# Patient Record
Sex: MALE | Race: BLACK OR AFRICAN AMERICAN
[De-identification: names, ages, dates, MRNs, and addresses within clinical notes are randomized per-mention and may not be internally consistent; named-entity substitution may affect disease eponyms.]

---

## 2017-01-19 ENCOUNTER — APPOINTMENT (OUTPATIENT)
Dept: UROLOGY | Facility: CLINIC | Age: 77
End: 2017-01-19

## 2018-11-19 ENCOUNTER — APPOINTMENT (OUTPATIENT)
Dept: NEUROLOGY | Facility: CLINIC | Age: 78
End: 2018-11-19

## 2018-11-21 ENCOUNTER — APPOINTMENT (OUTPATIENT)
Dept: NEUROLOGY | Facility: CLINIC | Age: 78
End: 2018-11-21
Payer: MEDICARE

## 2018-11-21 VITALS
DIASTOLIC BLOOD PRESSURE: 85 MMHG | WEIGHT: 165 LBS | HEIGHT: 69 IN | HEART RATE: 75 BPM | SYSTOLIC BLOOD PRESSURE: 140 MMHG | BODY MASS INDEX: 24.44 KG/M2

## 2018-11-21 PROCEDURE — 99205 OFFICE O/P NEW HI 60 MIN: CPT

## 2018-11-23 ENCOUNTER — OTHER (OUTPATIENT)
Age: 78
End: 2018-11-23

## 2019-05-21 ENCOUNTER — APPOINTMENT (OUTPATIENT)
Dept: NEUROLOGY | Facility: CLINIC | Age: 79
End: 2019-05-21

## 2020-04-08 ENCOUNTER — APPOINTMENT (OUTPATIENT)
Dept: DISASTER EMERGENCY | Facility: CLINIC | Age: 80
End: 2020-04-08
Payer: MEDICARE

## 2020-04-08 VITALS — BODY MASS INDEX: 24.14 KG/M2 | HEIGHT: 69 IN | WEIGHT: 163 LBS

## 2020-04-08 VITALS
SYSTOLIC BLOOD PRESSURE: 132 MMHG | HEART RATE: 84 BPM | RESPIRATION RATE: 12 BRPM | DIASTOLIC BLOOD PRESSURE: 80 MMHG | OXYGEN SATURATION: 94 % | TEMPERATURE: 98 F

## 2020-04-08 LAB — SARS-COV-2 N GENE NPH QL NAA+PROBE: DETECTED

## 2020-04-08 PROCEDURE — 99202 OFFICE O/P NEW SF 15 MIN: CPT

## 2020-04-08 NOTE — HISTORY OF PRESENT ILLNESS
[Congestion] : congestion [Cough] : cough [___ Weeks ago] :  [unfilled] weeks ago [Constant] : constant [Patient presents to the office today for COVID-19 evaluation and testing.] : Patient presents to the office today for COVID-19 evaluation and testing. [] : moderate dizziness on standing [With Suspected Case] : patient exposed to a suspected case of COVID-19 [Age >= 60 years] : age >= 60 years [None] : none [Clear] : clear [Good Air Entry] : good air entry [Speaks in full sentences] : speaks in full sentences [Normal O2 sat at rest] : normal O2 sat at rest [Grossly normal, interacts, not tired or weak] : grossly normal, interacts, not tired or weak [COVID-19 testing ordered and specimen obtained] : COVID-19 testing ordered and specimen obtained [Discharged with current Quarantine instructions and advised of signs of worsening illness.] : Patient discharged with current quarantine instructions and advised of signs of worsening illness. Patient told to seek emergent care if symptoms occur. [FreeTextEntry1] : Jt is 79 year old male c/o coughing and intermittent dizziness x 1 week. Denies fever, body aches or chills. He reports mild diarrhea, denies vomiting. PMH includes HTN. He lives with his wife.  [TextBox_107] : Patient education provided for COVID-19. Explained increased symptoms and SOB, patient instructed to go to ED.  Discussed isolation precautions and handwashing techniques. Social distancing reviewed. Utilize Tylenol for fever >101. No signs of cardiovascular decompensation. Patient verbalized understanding of provided instructions. Tests results may take up to 3-7 days to result. Discussed possibility of false negative results. Instructed to self quarantine and must remain quarantined x 14 days and no fever for three days without antipyretic medication.  All patient close contacts should also self quarantine. Social distancing once quarantine is completed. If patient has symptoms of chest discomfort, severe SOB at rest, worsening shortness of breath with minimal exertion, new or worsening wheezing, dizziness were instructed to seek immediate medical evaluation. \par \par \par \par  [FreeTextEntry8] : Jt is 79 year old male c/o coughing and intermittent dizziness x 1 week. Denies fever, body aches or chills. He reports mild diarrhea, denies vomiting. PMH includes HTN. He lives with his wife.

## 2020-05-08 ENCOUNTER — TRANSCRIPTION ENCOUNTER (OUTPATIENT)
Age: 80
End: 2020-05-08

## 2020-06-20 ENCOUNTER — TRANSCRIPTION ENCOUNTER (OUTPATIENT)
Age: 80
End: 2020-06-20

## 2021-04-13 ENCOUNTER — NON-APPOINTMENT (OUTPATIENT)
Age: 81
End: 2021-04-13

## 2021-04-13 ENCOUNTER — APPOINTMENT (OUTPATIENT)
Dept: INTERNAL MEDICINE | Facility: CLINIC | Age: 81
End: 2021-04-13
Payer: MEDICARE

## 2021-04-13 VITALS
DIASTOLIC BLOOD PRESSURE: 79 MMHG | TEMPERATURE: 97.7 F | HEIGHT: 69 IN | HEART RATE: 80 BPM | SYSTOLIC BLOOD PRESSURE: 131 MMHG | OXYGEN SATURATION: 99 % | BODY MASS INDEX: 24.44 KG/M2 | WEIGHT: 165 LBS | RESPIRATION RATE: 16 BRPM

## 2021-04-13 DIAGNOSIS — Z20.822 CONTACT WITH AND (SUSPECTED) EXPOSURE TO COVID-19: ICD-10-CM

## 2021-04-13 DIAGNOSIS — F32.9 ANXIETY DISORDER, UNSPECIFIED: ICD-10-CM

## 2021-04-13 DIAGNOSIS — R33.9 RETENTION OF URINE, UNSPECIFIED: ICD-10-CM

## 2021-04-13 DIAGNOSIS — Z87.39 PERSONAL HISTORY OF OTHER DISEASES OF THE MUSCULOSKELETAL SYSTEM AND CONNECTIVE TISSUE: ICD-10-CM

## 2021-04-13 DIAGNOSIS — F41.9 ANXIETY DISORDER, UNSPECIFIED: ICD-10-CM

## 2021-04-13 DIAGNOSIS — Z87.448 PERSONAL HISTORY OF OTHER DISEASES OF URINARY SYSTEM: ICD-10-CM

## 2021-04-13 DIAGNOSIS — Z87.898 PERSONAL HISTORY OF OTHER SPECIFIED CONDITIONS: ICD-10-CM

## 2021-04-13 DIAGNOSIS — N40.1 BENIGN PROSTATIC HYPERPLASIA WITH LOWER URINARY TRACT SYMPMS: ICD-10-CM

## 2021-04-13 PROCEDURE — 99072 ADDL SUPL MATRL&STAF TM PHE: CPT

## 2021-04-13 PROCEDURE — 93000 ELECTROCARDIOGRAM COMPLETE: CPT | Mod: 59

## 2021-04-13 PROCEDURE — G0439: CPT

## 2021-04-13 PROCEDURE — G0444 DEPRESSION SCREEN ANNUAL: CPT | Mod: 59

## 2021-04-13 RX ORDER — BLOOD-GLUCOSE METER
KIT MISCELLANEOUS
Qty: 2 | Refills: 1 | Status: ACTIVE | COMMUNITY
Start: 2021-04-13 | End: 1900-01-01

## 2021-04-13 RX ORDER — ESCITALOPRAM OXALATE 20 MG/1
20 TABLET ORAL
Qty: 30 | Refills: 0 | Status: DISCONTINUED | COMMUNITY
Start: 2020-11-04

## 2021-04-13 RX ORDER — CHLORHEXIDINE GLUCONATE 4 %
1000 LIQUID (ML) TOPICAL
Refills: 0 | Status: ACTIVE | COMMUNITY
Start: 2021-03-24

## 2021-04-13 RX ORDER — AMITRIPTYLINE HYDROCHLORIDE 75 MG/1
75 TABLET, FILM COATED ORAL
Qty: 30 | Refills: 0 | Status: DISCONTINUED | COMMUNITY
Start: 2020-11-04

## 2021-04-13 RX ORDER — LANCETS 30 GAUGE
EACH MISCELLANEOUS
Qty: 180 | Refills: 1 | Status: ACTIVE | COMMUNITY
Start: 2021-04-13 | End: 1900-01-01

## 2021-04-13 RX ORDER — BLOOD-GLUCOSE METER
W/DEVICE KIT MISCELLANEOUS
Qty: 1 | Refills: 0 | Status: ACTIVE | COMMUNITY
Start: 2021-04-13 | End: 1900-01-01

## 2021-04-13 RX ORDER — NYSTATIN 100000 [USP'U]/G
100000 CREAM TOPICAL
Qty: 60 | Refills: 0 | Status: DISCONTINUED | COMMUNITY
Start: 2020-12-04

## 2021-04-13 RX ORDER — FINASTERIDE 1 MG/1
TABLET ORAL
Refills: 0 | Status: ACTIVE | COMMUNITY
Start: 2021-04-13

## 2021-04-13 RX ORDER — AMITRIPTYLINE HYDROCHLORIDE 25 MG/1
25 TABLET, FILM COATED ORAL
Refills: 0 | Status: ACTIVE | COMMUNITY
Start: 2021-02-23

## 2021-04-18 LAB
25(OH)D3 SERPL-MCNC: 28.4 NG/ML
ALBUMIN SERPL ELPH-MCNC: 4.5 G/DL
ALP BLD-CCNC: 55 U/L
ALT SERPL-CCNC: 16 U/L
ANION GAP SERPL CALC-SCNC: 13 MMOL/L
AST SERPL-CCNC: 21 U/L
BASOPHILS # BLD AUTO: 0.04 K/UL
BASOPHILS NFR BLD AUTO: 1 %
BILIRUB SERPL-MCNC: 0.4 MG/DL
BUN SERPL-MCNC: 16 MG/DL
CALCIUM SERPL-MCNC: 11 MG/DL
CHLORIDE SERPL-SCNC: 96 MMOL/L
CHOLEST SERPL-MCNC: 129 MG/DL
CO2 SERPL-SCNC: 28 MMOL/L
CREAT SERPL-MCNC: 1.24 MG/DL
CREAT SPEC-SCNC: 80 MG/DL
EOSINOPHIL # BLD AUTO: 0.16 K/UL
EOSINOPHIL NFR BLD AUTO: 3.9 %
ESTIMATED AVERAGE GLUCOSE: 169 MG/DL
GLUCOSE SERPL-MCNC: 170 MG/DL
HBA1C MFR BLD HPLC: 7.5 %
HCT VFR BLD CALC: 36.3 %
HDLC SERPL-MCNC: 63 MG/DL
HGB BLD-MCNC: 12.5 G/DL
IMM GRANULOCYTES NFR BLD AUTO: 0.2 %
LDLC SERPL CALC-MCNC: 49 MG/DL
LYMPHOCYTES # BLD AUTO: 1.44 K/UL
LYMPHOCYTES NFR BLD AUTO: 35.3 %
MAN DIFF?: NORMAL
MCHC RBC-ENTMCNC: 32.4 PG
MCHC RBC-ENTMCNC: 34.4 GM/DL
MCV RBC AUTO: 94 FL
MICROALBUMIN 24H UR DL<=1MG/L-MCNC: <1.2 MG/DL
MICROALBUMIN/CREAT 24H UR-RTO: NORMAL MG/G
MONOCYTES # BLD AUTO: 0.42 K/UL
MONOCYTES NFR BLD AUTO: 10.3 %
NEUTROPHILS # BLD AUTO: 2.01 K/UL
NEUTROPHILS NFR BLD AUTO: 49.3 %
NONHDLC SERPL-MCNC: 66 MG/DL
PLATELET # BLD AUTO: 168 K/UL
POTASSIUM SERPL-SCNC: 4.5 MMOL/L
PROT SERPL-MCNC: 7.7 G/DL
PSA SERPL-MCNC: 3.47 NG/ML
RBC # BLD: 3.86 M/UL
RBC # FLD: 14.1 %
SODIUM SERPL-SCNC: 137 MMOL/L
TRIGL SERPL-MCNC: 85 MG/DL
TSH SERPL-ACNC: 3.46 UIU/ML
WBC # FLD AUTO: 4.08 K/UL

## 2021-04-20 LAB
ALBUMIN SERPL ELPH-MCNC: 4.3 G/DL
ALP BLD-CCNC: 54 U/L
ALT SERPL-CCNC: 13 U/L
ANION GAP SERPL CALC-SCNC: 15 MMOL/L
AST SERPL-CCNC: 23 U/L
BASOPHILS # BLD AUTO: 0.04 K/UL
BASOPHILS NFR BLD AUTO: 0.7 %
BILIRUB SERPL-MCNC: 0.2 MG/DL
BUN SERPL-MCNC: 23 MG/DL
CALCIUM SERPL-MCNC: 10.4 MG/DL
CALCIUM SERPL-MCNC: 10.4 MG/DL
CHLORIDE SERPL-SCNC: 94 MMOL/L
CO2 SERPL-SCNC: 26 MMOL/L
CREAT SERPL-MCNC: 1.78 MG/DL
EOSINOPHIL # BLD AUTO: 0.17 K/UL
EOSINOPHIL NFR BLD AUTO: 2.9 %
FERRITIN SERPL-MCNC: 82 NG/ML
FOLATE SERPL-MCNC: >20 NG/ML
GLUCOSE SERPL-MCNC: 191 MG/DL
HCT VFR BLD CALC: 33.9 %
HGB BLD-MCNC: 11.7 G/DL
IMM GRANULOCYTES NFR BLD AUTO: 0.3 %
IRON SATN MFR SERPL: 27 %
IRON SERPL-MCNC: 81 UG/DL
LYMPHOCYTES # BLD AUTO: 2.39 K/UL
LYMPHOCYTES NFR BLD AUTO: 40.8 %
MAN DIFF?: NORMAL
MCHC RBC-ENTMCNC: 32.3 PG
MCHC RBC-ENTMCNC: 34.5 GM/DL
MCV RBC AUTO: 93.6 FL
MONOCYTES # BLD AUTO: 0.55 K/UL
MONOCYTES NFR BLD AUTO: 9.4 %
NEUTROPHILS # BLD AUTO: 2.69 K/UL
NEUTROPHILS NFR BLD AUTO: 45.9 %
PARATHYROID HORMONE INTACT: 66 PG/ML
PLATELET # BLD AUTO: 164 K/UL
POTASSIUM SERPL-SCNC: 4.4 MMOL/L
PROT SERPL-MCNC: 7.3 G/DL
RBC # BLD: 3.62 M/UL
RBC # FLD: 14 %
SODIUM SERPL-SCNC: 136 MMOL/L
TIBC SERPL-MCNC: 296 UG/DL
TRANSFERRIN SERPL-MCNC: 230 MG/DL
UIBC SERPL-MCNC: 215 UG/DL
VIT B12 SERPL-MCNC: 706 PG/ML
WBC # FLD AUTO: 5.86 K/UL

## 2021-04-25 LAB
ALBUMIN SERPL ELPH-MCNC: 4.3 G/DL
ALP BLD-CCNC: 57 U/L
ALT SERPL-CCNC: 14 U/L
ANION GAP SERPL CALC-SCNC: 13 MMOL/L
AST SERPL-CCNC: 22 U/L
BILIRUB SERPL-MCNC: 0.5 MG/DL
BUN SERPL-MCNC: 14 MG/DL
CALCIUM SERPL-MCNC: 10.4 MG/DL
CHLORIDE SERPL-SCNC: 95 MMOL/L
CO2 SERPL-SCNC: 26 MMOL/L
CREAT SERPL-MCNC: 1.24 MG/DL
GLUCOSE SERPL-MCNC: 188 MG/DL
POTASSIUM SERPL-SCNC: 4.6 MMOL/L
PROT SERPL-MCNC: 7.4 G/DL
SODIUM SERPL-SCNC: 134 MMOL/L

## 2021-05-04 ENCOUNTER — APPOINTMENT (OUTPATIENT)
Dept: ULTRASOUND IMAGING | Facility: CLINIC | Age: 81
End: 2021-05-04
Payer: MEDICARE

## 2021-05-04 ENCOUNTER — OUTPATIENT (OUTPATIENT)
Dept: OUTPATIENT SERVICES | Facility: HOSPITAL | Age: 81
LOS: 1 days | End: 2021-05-04
Payer: MEDICARE

## 2021-05-04 DIAGNOSIS — E34.9 ENDOCRINE DISORDER, UNSPECIFIED: ICD-10-CM

## 2021-05-04 DIAGNOSIS — Z98.89 OTHER SPECIFIED POSTPROCEDURAL STATES: Chronic | ICD-10-CM

## 2021-05-04 PROCEDURE — 76536 US EXAM OF HEAD AND NECK: CPT

## 2021-05-04 PROCEDURE — 76536 US EXAM OF HEAD AND NECK: CPT | Mod: 26

## 2021-05-04 PROCEDURE — 76775 US EXAM ABDO BACK WALL LIM: CPT | Mod: 26

## 2021-05-04 PROCEDURE — 76775 US EXAM ABDO BACK WALL LIM: CPT

## 2021-06-01 ENCOUNTER — NON-APPOINTMENT (OUTPATIENT)
Age: 81
End: 2021-06-01

## 2021-06-01 ENCOUNTER — APPOINTMENT (OUTPATIENT)
Dept: CARDIOLOGY | Facility: CLINIC | Age: 81
End: 2021-06-01
Payer: MEDICARE

## 2021-06-01 VITALS
HEART RATE: 75 BPM | DIASTOLIC BLOOD PRESSURE: 76 MMHG | RESPIRATION RATE: 16 BRPM | SYSTOLIC BLOOD PRESSURE: 130 MMHG | HEIGHT: 69 IN | OXYGEN SATURATION: 99 % | WEIGHT: 168 LBS | TEMPERATURE: 97.8 F | BODY MASS INDEX: 24.88 KG/M2

## 2021-06-01 DIAGNOSIS — R60.0 LOCALIZED EDEMA: ICD-10-CM

## 2021-06-01 PROCEDURE — 93000 ELECTROCARDIOGRAM COMPLETE: CPT

## 2021-06-01 PROCEDURE — 99204 OFFICE O/P NEW MOD 45 MIN: CPT

## 2021-06-01 NOTE — DISCUSSION/SUMMARY
[Echocardiogram] : an echocardiogram [Stress Test Pharmacologic] : a pharmacologic stress test [Low Sodium Diet] : low sodium diet [Patient] : the patient [de-identified] : possible CAD

## 2021-06-01 NOTE — PHYSICAL EXAM
[Well Developed] : well developed [Well Nourished] : well nourished [No Acute Distress] : no acute distress [Normal Conjunctiva] : normal conjunctiva [Normal Venous Pressure] : normal venous pressure [No Carotid Bruit] : no carotid bruit [Normal S1, S2] : normal S1, S2 [No Murmur] : no murmur [No Rub] : no rub [No Gallop] : no gallop [Clear Lung Fields] : clear lung fields [Good Air Entry] : good air entry [No Respiratory Distress] : no respiratory distress  [Soft] : abdomen soft [Non Tender] : non-tender [No Masses/organomegaly] : no masses/organomegaly [Normal Bowel Sounds] : normal bowel sounds [No Cyanosis] : no cyanosis [No Clubbing] : no clubbing [No Varicosities] : no varicosities [Edema ___] : edema [unfilled] [No Rash] : no rash [No Skin Lesions] : no skin lesions [Moves all extremities] : moves all extremities [No Focal Deficits] : no focal deficits [Normal Speech] : normal speech [Alert and Oriented] : alert and oriented [Normal memory] : normal memory

## 2021-06-01 NOTE — HISTORY OF PRESENT ILLNESS
[FreeTextEntry1] : Patient is referred for evaluation of dyspnea.  He has had a prior stroke, but no prior history of myocardial infarction angina or invasive cardiac testing or procedures.\par \par He claims he has chronic shortness of breath when he exerts which is unchanged.  He also has edema which he says has been around for a long time in his legs.\par \par He denied chest pain or palpitations.  He claims compliance with medications.

## 2021-06-01 NOTE — REVIEW OF SYSTEMS
[SOB] : no shortness of breath [Dyspnea on exertion] : dyspnea during exertion [Lower Ext Edema] : lower extremity edema [Leg Claudication] : no intermittent leg claudication [Orthopnea] : no orthopnea [Negative] : Respiratory

## 2021-06-04 ENCOUNTER — APPOINTMENT (OUTPATIENT)
Dept: INTERNAL MEDICINE | Facility: CLINIC | Age: 81
End: 2021-06-04
Payer: MEDICARE

## 2021-06-04 VITALS — SYSTOLIC BLOOD PRESSURE: 136 MMHG | DIASTOLIC BLOOD PRESSURE: 72 MMHG

## 2021-06-04 VITALS
WEIGHT: 168 LBS | TEMPERATURE: 98 F | HEART RATE: 83 BPM | SYSTOLIC BLOOD PRESSURE: 142 MMHG | RESPIRATION RATE: 16 BRPM | HEIGHT: 69 IN | DIASTOLIC BLOOD PRESSURE: 84 MMHG | OXYGEN SATURATION: 100 % | BODY MASS INDEX: 24.88 KG/M2

## 2021-06-04 DIAGNOSIS — N17.9 ACUTE KIDNEY FAILURE, UNSPECIFIED: ICD-10-CM

## 2021-06-04 PROCEDURE — G0009: CPT

## 2021-06-04 PROCEDURE — 90670 PCV13 VACCINE IM: CPT

## 2021-06-04 PROCEDURE — 99214 OFFICE O/P EST MOD 30 MIN: CPT | Mod: 25

## 2021-06-06 ENCOUNTER — RX RENEWAL (OUTPATIENT)
Age: 81
End: 2021-06-06

## 2021-06-06 LAB
ANION GAP SERPL CALC-SCNC: 15 MMOL/L
BASOPHILS # BLD AUTO: 0.03 K/UL
BASOPHILS NFR BLD AUTO: 0.6 %
BUN SERPL-MCNC: 18 MG/DL
CALCIUM SERPL-MCNC: 10.5 MG/DL
CHLORIDE SERPL-SCNC: 96 MMOL/L
CO2 SERPL-SCNC: 25 MMOL/L
CREAT SERPL-MCNC: 1.15 MG/DL
EOSINOPHIL # BLD AUTO: 0.15 K/UL
EOSINOPHIL NFR BLD AUTO: 3 %
FERRITIN SERPL-MCNC: 60 NG/ML
GLUCOSE SERPL-MCNC: 142 MG/DL
HCT VFR BLD CALC: 35.6 %
HGB BLD-MCNC: 12 G/DL
IMM GRANULOCYTES NFR BLD AUTO: 0.2 %
IRON SATN MFR SERPL: 26 %
IRON SERPL-MCNC: 72 UG/DL
LYMPHOCYTES # BLD AUTO: 1.72 K/UL
LYMPHOCYTES NFR BLD AUTO: 34.9 %
MAN DIFF?: NORMAL
MCHC RBC-ENTMCNC: 32 PG
MCHC RBC-ENTMCNC: 33.7 GM/DL
MCV RBC AUTO: 94.9 FL
MONOCYTES # BLD AUTO: 0.42 K/UL
MONOCYTES NFR BLD AUTO: 8.5 %
NEUTROPHILS # BLD AUTO: 2.6 K/UL
NEUTROPHILS NFR BLD AUTO: 52.8 %
PLATELET # BLD AUTO: 187 K/UL
POTASSIUM SERPL-SCNC: 4.2 MMOL/L
RBC # BLD: 3.75 M/UL
RBC # FLD: 14.4 %
SODIUM SERPL-SCNC: 136 MMOL/L
TIBC SERPL-MCNC: 276 UG/DL
TRANSFERRIN SERPL-MCNC: 250 MG/DL
UIBC SERPL-MCNC: 204 UG/DL
WBC # FLD AUTO: 4.93 K/UL

## 2021-06-22 ENCOUNTER — APPOINTMENT (OUTPATIENT)
Dept: CARDIOLOGY | Facility: CLINIC | Age: 81
End: 2021-06-22
Payer: MEDICARE

## 2021-06-22 PROCEDURE — 93306 TTE W/DOPPLER COMPLETE: CPT

## 2021-07-01 ENCOUNTER — APPOINTMENT (OUTPATIENT)
Dept: CARDIOLOGY | Facility: CLINIC | Age: 81
End: 2021-07-01
Payer: MEDICARE

## 2021-07-01 PROCEDURE — 93015 CV STRESS TEST SUPVJ I&R: CPT

## 2021-07-01 PROCEDURE — A9500: CPT

## 2021-07-01 PROCEDURE — 78452 HT MUSCLE IMAGE SPECT MULT: CPT

## 2021-07-06 ENCOUNTER — APPOINTMENT (OUTPATIENT)
Dept: CARDIOLOGY | Facility: CLINIC | Age: 81
End: 2021-07-06
Payer: MEDICARE

## 2021-07-06 VITALS
DIASTOLIC BLOOD PRESSURE: 71 MMHG | WEIGHT: 166 LBS | OXYGEN SATURATION: 98 % | HEART RATE: 75 BPM | SYSTOLIC BLOOD PRESSURE: 119 MMHG | HEIGHT: 69 IN | TEMPERATURE: 97.8 F | RESPIRATION RATE: 17 BRPM | BODY MASS INDEX: 24.59 KG/M2

## 2021-07-06 PROCEDURE — 99213 OFFICE O/P EST LOW 20 MIN: CPT

## 2021-07-06 NOTE — DISCUSSION/SUMMARY
[Patient] : the patient [FreeTextEntry1] : Discussed findings with patient and daughter questions addressed.  To obtain results of pulmonary testing and follow-up with PMD.  See me as needed.  Risk factor management.  Questions addressed.

## 2021-07-06 NOTE — REASON FOR VISIT
[Symptom and Test Evaluation] : symptom and test evaluation [Other: ____] : [unfilled] [Family Member] : family member [FreeTextEntry1] : Follow-up visit and discussion.  Has chronic dyspnea which is unchanged been present for years.  Smoked remotely when he was younger he did have some wheezing.  He has seen the pulmonologist but reports are not available.\par \par Cardiologically no chest pain palpitations.  Has had echo and nuclear stress results.  Discussed with patient and his family member today.  Nuclear stress was negative.

## 2021-08-22 ENCOUNTER — RX RENEWAL (OUTPATIENT)
Age: 81
End: 2021-08-22

## 2021-10-07 ENCOUNTER — RX RENEWAL (OUTPATIENT)
Age: 81
End: 2021-10-07

## 2021-10-10 ENCOUNTER — RX RENEWAL (OUTPATIENT)
Age: 81
End: 2021-10-10

## 2021-11-04 ENCOUNTER — APPOINTMENT (OUTPATIENT)
Dept: INTERNAL MEDICINE | Facility: CLINIC | Age: 81
End: 2021-11-04
Payer: MEDICARE

## 2021-11-04 VITALS
TEMPERATURE: 97.6 F | OXYGEN SATURATION: 99 % | SYSTOLIC BLOOD PRESSURE: 127 MMHG | BODY MASS INDEX: 24.29 KG/M2 | DIASTOLIC BLOOD PRESSURE: 81 MMHG | WEIGHT: 164 LBS | HEIGHT: 69 IN | RESPIRATION RATE: 17 BRPM

## 2021-11-04 PROCEDURE — 99214 OFFICE O/P EST MOD 30 MIN: CPT

## 2021-11-05 LAB
25(OH)D3 SERPL-MCNC: 29 NG/ML
ALBUMIN SERPL ELPH-MCNC: 4.9 G/DL
ALP BLD-CCNC: 57 U/L
ALT SERPL-CCNC: 19 U/L
ANION GAP SERPL CALC-SCNC: 14 MMOL/L
APPEARANCE: CLEAR
AST SERPL-CCNC: 28 U/L
BACTERIA: NEGATIVE
BASOPHILS # BLD AUTO: 0.04 K/UL
BASOPHILS NFR BLD AUTO: 0.9 %
BILIRUB SERPL-MCNC: 0.4 MG/DL
BILIRUBIN URINE: NEGATIVE
BLOOD URINE: NEGATIVE
BUN SERPL-MCNC: 10 MG/DL
CALCIUM SERPL-MCNC: 10.9 MG/DL
CHLORIDE SERPL-SCNC: 96 MMOL/L
CHOLEST SERPL-MCNC: 153 MG/DL
CO2 SERPL-SCNC: 28 MMOL/L
COLOR: COLORLESS
CREAT SERPL-MCNC: 1.08 MG/DL
CREAT SPEC-SCNC: 32 MG/DL
EOSINOPHIL # BLD AUTO: 0.1 K/UL
EOSINOPHIL NFR BLD AUTO: 2.2 %
ESTIMATED AVERAGE GLUCOSE: 183 MG/DL
FERRITIN SERPL-MCNC: 63 NG/ML
FOLATE SERPL-MCNC: >20 NG/ML
GLUCOSE QUALITATIVE U: NEGATIVE
GLUCOSE SERPL-MCNC: 164 MG/DL
HBA1C MFR BLD HPLC: 8 %
HCT VFR BLD CALC: 37.2 %
HDLC SERPL-MCNC: 81 MG/DL
HGB BLD-MCNC: 12.7 G/DL
HYALINE CASTS: 0 /LPF
IMM GRANULOCYTES NFR BLD AUTO: 0.2 %
IRON SATN MFR SERPL: 33 %
IRON SERPL-MCNC: 108 UG/DL
KETONES URINE: NEGATIVE
LDLC SERPL CALC-MCNC: 61 MG/DL
LEUKOCYTE ESTERASE URINE: NEGATIVE
LYMPHOCYTES # BLD AUTO: 1.64 K/UL
LYMPHOCYTES NFR BLD AUTO: 36 %
MAN DIFF?: NORMAL
MCHC RBC-ENTMCNC: 32.4 PG
MCHC RBC-ENTMCNC: 34.1 GM/DL
MCV RBC AUTO: 94.9 FL
MICROALBUMIN 24H UR DL<=1MG/L-MCNC: <1.2 MG/DL
MICROALBUMIN/CREAT 24H UR-RTO: NORMAL MG/G
MICROSCOPIC-UA: NORMAL
MONOCYTES # BLD AUTO: 0.39 K/UL
MONOCYTES NFR BLD AUTO: 8.6 %
NEUTROPHILS # BLD AUTO: 2.38 K/UL
NEUTROPHILS NFR BLD AUTO: 52.1 %
NITRITE URINE: NEGATIVE
NONHDLC SERPL-MCNC: 72 MG/DL
PH URINE: 7.5
PLATELET # BLD AUTO: 171 K/UL
POTASSIUM SERPL-SCNC: 4.3 MMOL/L
PROT SERPL-MCNC: 7.9 G/DL
PROTEIN URINE: NEGATIVE
RBC # BLD: 3.92 M/UL
RBC # FLD: 13.5 %
RED BLOOD CELLS URINE: 0 /HPF
SODIUM SERPL-SCNC: 138 MMOL/L
SPECIFIC GRAVITY URINE: 1.01
SQUAMOUS EPITHELIAL CELLS: 0 /HPF
TIBC SERPL-MCNC: 330 UG/DL
TRIGL SERPL-MCNC: 52 MG/DL
UIBC SERPL-MCNC: 222 UG/DL
UROBILINOGEN URINE: NORMAL
VIT B12 SERPL-MCNC: 870 PG/ML
WBC # FLD AUTO: 4.56 K/UL
WHITE BLOOD CELLS URINE: 0 /HPF

## 2021-11-07 ENCOUNTER — RX RENEWAL (OUTPATIENT)
Age: 81
End: 2021-11-07

## 2021-11-16 ENCOUNTER — APPOINTMENT (OUTPATIENT)
Dept: INTERNAL MEDICINE | Facility: CLINIC | Age: 81
End: 2021-11-16

## 2021-11-23 ENCOUNTER — APPOINTMENT (OUTPATIENT)
Dept: ENDOCRINOLOGY | Facility: CLINIC | Age: 81
End: 2021-11-23
Payer: MEDICARE

## 2021-11-23 VITALS
DIASTOLIC BLOOD PRESSURE: 77 MMHG | OXYGEN SATURATION: 99 % | WEIGHT: 165 LBS | HEART RATE: 66 BPM | TEMPERATURE: 97.8 F | SYSTOLIC BLOOD PRESSURE: 125 MMHG | HEIGHT: 69 IN | BODY MASS INDEX: 24.44 KG/M2

## 2021-11-23 PROCEDURE — 99204 OFFICE O/P NEW MOD 45 MIN: CPT

## 2021-11-23 NOTE — REASON FOR VISIT
[Initial Evaluation] : an initial evaluation [Hypercalcemia] : hypercalcemia [DM Type 2] : DM Type 2 [Spouse] : spouse

## 2021-11-27 NOTE — ASSESSMENT
[Diabetes Foot Care] : diabetes foot care [Carbohydrate Consistent Diet] : carbohydrate consistent diet [Importance of Diet and Exercise] : importance of diet and exercise to improve glycemic control, achieve weight loss and improve cardiovascular health [Hypoglycemia Management] : hypoglycemia management [Self Monitoring of Blood Glucose] : self monitoring of blood glucose [Retinopathy Screening] : Patient was referred to ophthalmology for retinopathy screening [FreeTextEntry1] : 1. mild primary hyperparathyroidism with mild hypercalcemia.\par Mildly elevated calcium as per review of bloodwork done in past year with elevated PTH 66, likely early primary hyperparathyroidism\par No h/o nephrolithiasis, fractures or osteoporosis\par Patient is currently asymptomatic , will continue to do routine surveillance at this time with bloodwork\par Medical management is not indicated at this time, nor surgical management\par Instructed patient to do 24 hr urine calcium w/ crt\par Advised to take over the counter vitamin D, avoid calcium supplementation, and to increase water/hydration\par \par 2. Type 2 DM\par A1c noted 8.0% in November 2021, not at goal\par No episodes of hypoglycemia on current regimen\par Explained indications, benefits and side effects of SGLT2i medication as adjunctive therapy; patient agreed to try.\par Start Farxiga 5mg daily\par Continue Metformin 1000mg po BID\par \par Answered all questions today; patient verbalized understanding of the above.\par RTC in 3 months.

## 2021-11-27 NOTE — PHYSICAL EXAM
[Alert] : alert [Well Nourished] : well nourished [No Acute Distress] : no acute distress [Normal Sclera/Conjunctiva] : normal sclera/conjunctiva [EOMI] : extra ocular movement intact [No Proptosis] : no proptosis [No Lid Lag] : no lid lag [No LAD] : no lymphadenopathy [Supple] : the neck was supple [Thyroid Not Enlarged] : the thyroid was not enlarged [No Thyroid Nodules] : no palpable thyroid nodules [No Respiratory Distress] : no respiratory distress [No Accessory Muscle Use] : no accessory muscle use [Normal Rate and Effort] : normal respiratory rate and effort [Clear to Auscultation] : lungs were clear to auscultation bilaterally [No Edema] : no peripheral edema [Normal Bowel Sounds] : normal bowel sounds [Not Tender] : non-tender [Not Distended] : not distended [Soft] : abdomen soft [No Stigmata of Cushings Syndrome] : no stigmata of Cushings Syndrome [Normal Gait] : normal gait [No Clubbing, Cyanosis] : no clubbing  or cyanosis of the fingernails [No Rash] : no rash [Normal Reflexes] : deep tendon reflexes were 2+ and symmetric [No Tremors] : no tremors [Oriented x3] : oriented to person, place, and time [Abdominal Striae] : no abdominal striae [Acanthosis Nigricans] : no acanthosis nigricans [Acne] : no acne

## 2021-11-27 NOTE — CONSULT LETTER
[Dear  ___] : Dear  [unfilled], [Consult Letter:] : I had the pleasure of evaluating your patient, [unfilled]. [Please see my note below.] : Please see my note below. [Consult Closing:] : Thank you very much for allowing me to participate in the care of this patient.  If you have any questions, please do not hesitate to contact me. [Sincerely,] : Sincerely, [FreeTextEntry3] : Ester Perry, DO\par Endocrinologist \par [facility]\par

## 2021-11-27 NOTE — REVIEW OF SYSTEMS
[Polyuria] : polyuria [As Noted in HPI] : as noted in HPI [Fatigue] : no fatigue [Decreased Appetite] : appetite not decreased [Recent Weight Gain (___ Lbs)] : no recent weight gain [Recent Weight Loss (___ Lbs)] : no recent weight loss [Fever] : no fever [Chills] : no chills [Blurred Vision] : no blurred vision [Dysphagia] : no dysphagia [Dysphonia] : no dysphonia [Chest Pain] : no chest pain [Slow Heart Rate] : heart rate is not slow [Palpitations] : no palpitations [Fast Heart Rate] : heart rate is not fast [Shortness Of Breath] : no shortness of breath [Cough] : no cough [Nausea] : no nausea [Constipation] : no constipation [Abdominal Pain] : no abdominal pain [Vomiting] : no vomiting [Diarrhea] : no diarrhea [Dysuria] : no dysuria [Nocturia] : no nocturia [Hesistancy] : no hesitancy [Incontinence] : no incontinence [Joint Pain] : no joint pain [Muscle Weakness] : no muscle weakness [Acanthosis] : no acanthosis  [Acne] : no acne [Dry Skin] : no dry skin [Hair Loss] : no hair loss [Headaches] : no headaches [Dizziness] : no dizziness [Confusion] : no confusion [Tremors] : no tremors [Pain/Numbness of Digits] : no pain/numbness of digits

## 2021-11-27 NOTE — HISTORY OF PRESENT ILLNESS
[FreeTextEntry1] : ADAMS CORONA is a 82 yo male with past medical history of CVA in 2019, HTN, HLD, hydrocephalus, MICK, normocytic anemia, BPH, type 2 DM, first degree AV block, who presents to clinic today as referred by PCP for management of hypercalcemia/hyperparathyroidism.\par \par Patient is here with his wife today and they both state they do not know why there were referred to endocrinology, they were told patient has "abnormal lab test". Patient is referred for noted elevated calcium and PTH. Patient denies h/o calcium or parathyroid disorder. He denies abdominal pain, polydipsia or polyuria, denies h/o kidney stones or h/o falls/fractures. \par Patient also notes he had an US thyroid/parathyroid done in May 2021, report read: "normal thyroid ultrasound, TIRAD 1".\par \par For type 2 diabetes, he was diagnosed 20 years ago, only taking oral medication, never on insulin. He currently takes Metformin 1000mg BID, and noted last hemoglobin A1c was 7.5% in April 2021.\par He checks fingerstick glucose daily; he notes AM fasting glucose ranges from 140-170.  Denies hypoglycemic episodes or hypoglycemic symptoms. Denies n/v/abdominal pain, numbness or tingling sensations, polydipsia, weight change. However he admits to intermittent polyuria.\par \par PMH:as above\par PSH: cataract surgery, hernia repair\par Family Hx: no thyroid cancer, father- DM, brother- DM, prostate cancer, son - DM \par Social Hx: former cigarette use (for 30 years 1/4 pack per day), denies ETOH or illicit drug use, retired.from [employer],  with 4 children. \par ALL: NKDA\par Home Meds: ASA 81mg daily, Metformin 1000mg BID, Norvasc 10mg daily, Amitriptyline 25mg qhs, Atorvastatin 10mg daily,  no fish oil, \par Finasteride daily, Lisinopril-HCTZ 20-25mg daily, Metoprolol tartrate 50mg BID, Tamsulosin 0.4mg daily, MVI\par

## 2021-11-30 LAB
CALCIUM ?TM UR-MCNC: 2.2 MG/DL
CALCIUM/CREAT UR: 0.1 RATIO
CAU: 2 MG/DL
CREAT 24H UR-MCNC: 0.8 G/24 H
CREAT ?TM UR-MCNC: 36 MG/DL
CREAT SPEC-SCNC: 35 MG/DL
PROT ?TM UR-MCNC: 24 HR
SPECIMEN VOL 24H UR: 2175 ML
SPECIMEN VOL 24H UR: 44 MG/24 H

## 2021-12-01 ENCOUNTER — NON-APPOINTMENT (OUTPATIENT)
Age: 81
End: 2021-12-01

## 2021-12-01 ENCOUNTER — APPOINTMENT (OUTPATIENT)
Dept: PULMONOLOGY | Facility: CLINIC | Age: 81
End: 2021-12-01
Payer: MEDICARE

## 2021-12-01 VITALS
DIASTOLIC BLOOD PRESSURE: 74 MMHG | HEART RATE: 75 BPM | TEMPERATURE: 97.6 F | SYSTOLIC BLOOD PRESSURE: 127 MMHG | OXYGEN SATURATION: 99 %

## 2021-12-01 DIAGNOSIS — Z01.812 ENCOUNTER FOR PREPROCEDURAL LABORATORY EXAMINATION: ICD-10-CM

## 2021-12-01 DIAGNOSIS — Z20.822 ENCOUNTER FOR PREPROCEDURAL LABORATORY EXAMINATION: ICD-10-CM

## 2021-12-01 PROCEDURE — 94618 PULMONARY STRESS TESTING: CPT

## 2021-12-01 PROCEDURE — 99204 OFFICE O/P NEW MOD 45 MIN: CPT | Mod: 25

## 2021-12-01 PROCEDURE — 71046 X-RAY EXAM CHEST 2 VIEWS: CPT

## 2021-12-01 RX ORDER — ALBUTEROL SULFATE 90 UG/1
108 (90 BASE) INHALANT RESPIRATORY (INHALATION)
Qty: 1 | Refills: 2 | Status: ACTIVE | COMMUNITY
Start: 2021-12-01 | End: 1900-01-01

## 2021-12-01 NOTE — HISTORY OF PRESENT ILLNESS
[Former] : former [TextBox_4] : 81M \par \par reports SIMONS for years\par smoker in teenage years\par no hx asthma/copd\par reports cough at night\par had covid last year, in hospital for 1 day\par denies pnd\par has gerd occasionally\par no environmental triggers to dyspnea or cough\par thinks he was exposed to asbestos in the past  [YearQuit] : 1960

## 2021-12-01 NOTE — PROCEDURE
[FreeTextEntry1] : CXR: clear lungs, no focal consolidation or pleural effusions, cardiac silhouette appears normal.  No bony abnormality.\par \par exercise oximetry: no significant O2 desat with 6 min of walking on room air\par \par \par \par Prior exam reviewed:\par Procedure was performed at the [facility] \par \par EXAM: CHEST PA LATERAL \par \par \par PROCEDURE DATE: 06/19/2020 \par \par \par INTERPRETATION: PA and lateral chest. \par \par Clinical indications: Cough. \par \par IMPRESSION: The heart and lungs are normal. There is mild degenerative \par change of the spine. \par \par \par

## 2021-12-11 ENCOUNTER — OUTPATIENT (OUTPATIENT)
Dept: OUTPATIENT SERVICES | Facility: HOSPITAL | Age: 81
LOS: 1 days | Discharge: HOME | End: 2021-12-11

## 2021-12-11 DIAGNOSIS — Z98.89 OTHER SPECIFIED POSTPROCEDURAL STATES: Chronic | ICD-10-CM

## 2021-12-11 DIAGNOSIS — D64.9 ANEMIA, UNSPECIFIED: ICD-10-CM

## 2021-12-15 ENCOUNTER — RESULT REVIEW (OUTPATIENT)
Age: 81
End: 2021-12-15

## 2021-12-15 ENCOUNTER — APPOINTMENT (OUTPATIENT)
Dept: HEMATOLOGY ONCOLOGY | Facility: CLINIC | Age: 81
End: 2021-12-15
Payer: MEDICARE

## 2021-12-15 VITALS
SYSTOLIC BLOOD PRESSURE: 118 MMHG | DIASTOLIC BLOOD PRESSURE: 76 MMHG | WEIGHT: 167.11 LBS | RESPIRATION RATE: 17 BRPM | HEIGHT: 68.5 IN | OXYGEN SATURATION: 99 % | TEMPERATURE: 97.1 F | BODY MASS INDEX: 25.04 KG/M2 | HEART RATE: 70 BPM

## 2021-12-15 LAB
BASOPHILS # BLD AUTO: 0.06 K/UL — SIGNIFICANT CHANGE UP (ref 0–0.2)
BASOPHILS NFR BLD AUTO: 1.3 % — SIGNIFICANT CHANGE UP (ref 0–2)
EOSINOPHIL # BLD AUTO: 0.16 K/UL — SIGNIFICANT CHANGE UP (ref 0–0.5)
EOSINOPHIL NFR BLD AUTO: 3.5 % — SIGNIFICANT CHANGE UP (ref 0–6)
HCT VFR BLD CALC: 35.7 % — LOW (ref 39–50)
HGB BLD-MCNC: 12.8 G/DL — LOW (ref 13–17)
IMM GRANULOCYTES NFR BLD AUTO: 3.1 % — HIGH (ref 0–1.5)
LYMPHOCYTES # BLD AUTO: 1.56 K/UL — SIGNIFICANT CHANGE UP (ref 1–3.3)
LYMPHOCYTES # BLD AUTO: 34.4 % — SIGNIFICANT CHANGE UP (ref 13–44)
MCHC RBC-ENTMCNC: 32.9 PG — SIGNIFICANT CHANGE UP (ref 27–34)
MCHC RBC-ENTMCNC: 35.9 G/DL — SIGNIFICANT CHANGE UP (ref 32–36)
MCV RBC AUTO: 91.8 FL — SIGNIFICANT CHANGE UP (ref 80–100)
MONOCYTES # BLD AUTO: 0.41 K/UL — SIGNIFICANT CHANGE UP (ref 0–0.9)
MONOCYTES NFR BLD AUTO: 9.1 % — SIGNIFICANT CHANGE UP (ref 2–14)
NEUTROPHILS # BLD AUTO: 2.2 K/UL — SIGNIFICANT CHANGE UP (ref 1.8–7.4)
NEUTROPHILS NFR BLD AUTO: 48.6 % — SIGNIFICANT CHANGE UP (ref 43–77)
NRBC # BLD: 0 /100 WBCS — SIGNIFICANT CHANGE UP (ref 0–0)
PLATELET # BLD AUTO: 188 K/UL — SIGNIFICANT CHANGE UP (ref 150–400)
RBC # BLD: 3.89 M/UL — LOW (ref 4.2–5.8)
RBC # FLD: 13.3 % — SIGNIFICANT CHANGE UP (ref 10.3–14.5)
WBC # BLD: 4.53 K/UL — SIGNIFICANT CHANGE UP (ref 3.8–10.5)
WBC # FLD AUTO: 4.53 K/UL — SIGNIFICANT CHANGE UP (ref 3.8–10.5)

## 2021-12-15 PROCEDURE — 99204 OFFICE O/P NEW MOD 45 MIN: CPT

## 2021-12-17 NOTE — HISTORY OF PRESENT ILLNESS
[de-identified] : 80 yo M with a PMH of anxiety/depression, DM2 (not on insulin), HTN, BPH, and  mild LE edema from poor circulation who presents as an initial consultation for anemia. He denies any bleeding from his stool or urine, denies any change in appetite or weight loss, and denies taking NSAIDs. He takes a baby aspirin daily. His last colonoscopy was about 7 years ago. He does note dyspnea on exertion, such as climbing a flight of stairs, and fatigue for the last few months. ROS is otherwise normal.

## 2021-12-17 NOTE — REVIEW OF SYSTEMS
[SOB on Exertion] : shortness of breath during exertion [Negative] : Allergic/Immunologic [Wheezing] : no wheezing [Cough] : no cough

## 2021-12-17 NOTE — ASSESSMENT
[FreeTextEntry1] : 80 yo M with a PMH of anxiety/depression, DM2 (not on insulin), HTN, BPH, and  mild LE edema from poor circulation who presents as an initial consultation for anemia.\par \par #Normocytic anemia\par - Patient has had a very mild anemia, currently Hb 12.8, that has been stable for at least nearly a year\par - Unlikely anemia is contributing to SOB\par - Patient does not appear to have any significant risk factors. He does have a brother who had colon cancer in his 60s, and the patient's last colonoscopy was about 7 years ago, but with a stable Hb, no symptoms, and stable weight there would not be an indiction for repeat colonoscopy\par - Recent iron studies, B12, folate are all normal\par - TSH this year normal\par - Patient has hypercalcemia. Unlikely that would be from plasma cell neoplasm since PTH checked earlier this year was mildly elevated, but would check immunoelectrophoresis and repeat CMP\par - All questions answered, continue to monitor\par \par Patient seen with and plan discussed with Dr. Portillo\par \par Aryles Hedjar, MD, PGY-4\par Hematology/Oncology Fellow\par [facility]

## 2021-12-23 LAB
ALBUMIN MFR SERPL ELPH: 57.4 %
ALBUMIN SERPL ELPH-MCNC: 4.6 G/DL
ALBUMIN SERPL-MCNC: 4.4 G/DL
ALBUMIN/GLOB SERPL: 1.4 RATIO
ALP BLD-CCNC: 54 U/L
ALPHA1 GLOB MFR SERPL ELPH: 3.2 %
ALPHA1 GLOB SERPL ELPH-MCNC: 0.2 G/DL
ALPHA2 GLOB MFR SERPL ELPH: 8.9 %
ALPHA2 GLOB SERPL ELPH-MCNC: 0.7 G/DL
ALT SERPL-CCNC: 16 U/L
ANION GAP SERPL CALC-SCNC: 15 MMOL/L
AST SERPL-CCNC: 20 U/L
B-GLOBULIN MFR SERPL ELPH: 14.1 %
B-GLOBULIN SERPL ELPH-MCNC: 1.1 G/DL
BILIRUB SERPL-MCNC: 0.3 MG/DL
BUN SERPL-MCNC: 17 MG/DL
CALCIUM SERPL-MCNC: 10.7 MG/DL
CHLORIDE SERPL-SCNC: 96 MMOL/L
CO2 SERPL-SCNC: 26 MMOL/L
CREAT SERPL-MCNC: 1.16 MG/DL
DEPRECATED KAPPA LC FREE/LAMBDA SER: 2.04 RATIO
GAMMA GLOB FLD ELPH-MCNC: 1.2 G/DL
GAMMA GLOB MFR SERPL ELPH: 16.4 %
GLUCOSE SERPL-MCNC: 132 MG/DL
IGA SER QL IEP: 506 MG/DL
IGG SER QL IEP: 1370 MG/DL
IGM SER QL IEP: 76 MG/DL
INTERPRETATION SERPL IEP-IMP: NORMAL
KAPPA LC CSF-MCNC: 1.56 MG/DL
KAPPA LC SERPL-MCNC: 3.18 MG/DL
M PROTEIN SPEC IFE-MCNC: NORMAL
POTASSIUM SERPL-SCNC: 4.4 MMOL/L
PROT SERPL-MCNC: 7.6 G/DL
SODIUM SERPL-SCNC: 136 MMOL/L

## 2022-01-03 ENCOUNTER — RX RENEWAL (OUTPATIENT)
Age: 82
End: 2022-01-03

## 2022-01-19 ENCOUNTER — APPOINTMENT (OUTPATIENT)
Dept: PULMONOLOGY | Facility: CLINIC | Age: 82
End: 2022-01-19
Payer: MEDICARE

## 2022-01-19 VITALS
HEART RATE: 79 BPM | BODY MASS INDEX: 25.01 KG/M2 | RESPIRATION RATE: 16 BRPM | TEMPERATURE: 97.9 F | WEIGHT: 165 LBS | DIASTOLIC BLOOD PRESSURE: 72 MMHG | HEIGHT: 68 IN | OXYGEN SATURATION: 98 % | SYSTOLIC BLOOD PRESSURE: 121 MMHG

## 2022-01-19 PROCEDURE — 94010 BREATHING CAPACITY TEST: CPT

## 2022-01-19 PROCEDURE — 94726 PLETHYSMOGRAPHY LUNG VOLUMES: CPT

## 2022-01-19 PROCEDURE — 94729 DIFFUSING CAPACITY: CPT

## 2022-01-19 PROCEDURE — 99213 OFFICE O/P EST LOW 20 MIN: CPT | Mod: 25

## 2022-01-19 PROCEDURE — ZZZZZ: CPT

## 2022-01-19 NOTE — PROCEDURE
[FreeTextEntry1] : PFT: Normal spirometry.  Lung volumes normal. DLCO mildly reduced.\par \par \par Prior exams reviewed:\par \par CXR: clear lungs, no focal consolidation or pleural effusions, cardiac silhouette appears normal.  No bony abnormality.\par \par exercise oximetry: no significant O2 desat with 6 min of walking on room air\par \par \par \par Procedure was performed at the [facility] \par \par EXAM: CHEST PA LATERAL \par \par \par PROCEDURE DATE: 06/19/2020 \par \par \par INTERPRETATION: PA and lateral chest. \par \par Clinical indications: Cough. \par \par IMPRESSION: The heart and lungs are normal. There is mild degenerative \par change of the spine. \par \par \par

## 2022-01-19 NOTE — CONSULT LETTER
[FreeTextEntry1] : Dear ,\par \par I had the pleasure of evaluating your patient, ADAMS CORONA today in pulmonary consultation.  Please refer to my attached note for my findings and recommendations.\par \par \par Thank you for allowing me to participate in the care of your patient, please feel free to call with any questions or concerns.\par \par \par Sincerely,\par \par Beata Acharya MD\par [facility] \par [facility]\par \par

## 2022-01-19 NOTE — ASSESSMENT
[FreeTextEntry1] : suggest trying albuterol prior to exertion to see if this helps\par exercise encouraged.\par fu with pcp

## 2022-01-19 NOTE — HISTORY OF PRESENT ILLNESS
[Former] : former [TextBox_4] : here for pft\par has not tried inhaler\par \par Prior hx\par \par 81M \par \par reports SIMONS for years\par smoker in teenage years\par no hx asthma/copd\par reports cough at night\par had covid last year, in hospital for 1 day\par denies pnd\par has gerd occasionally\par no environmental triggers to dyspnea or cough\par thinks he was exposed to asbestos in the past  [YearQuit] : 1960

## 2022-02-01 ENCOUNTER — APPOINTMENT (OUTPATIENT)
Dept: ENDOCRINOLOGY | Facility: CLINIC | Age: 82
End: 2022-02-01
Payer: MEDICARE

## 2022-02-01 VITALS
BODY MASS INDEX: 27 KG/M2 | SYSTOLIC BLOOD PRESSURE: 138 MMHG | OXYGEN SATURATION: 98 % | WEIGHT: 168 LBS | HEART RATE: 70 BPM | HEIGHT: 66 IN | DIASTOLIC BLOOD PRESSURE: 82 MMHG

## 2022-02-01 LAB
GLUCOSE BLDC GLUCOMTR-MCNC: 145
HBA1C MFR BLD HPLC: 8.2

## 2022-02-01 PROCEDURE — 99213 OFFICE O/P EST LOW 20 MIN: CPT | Mod: 25

## 2022-02-01 PROCEDURE — 82962 GLUCOSE BLOOD TEST: CPT

## 2022-02-01 PROCEDURE — 83036 HEMOGLOBIN GLYCOSYLATED A1C: CPT | Mod: QW

## 2022-02-03 NOTE — PHYSICAL EXAM
[Alert] : alert [Well Nourished] : well nourished [No Acute Distress] : no acute distress [Normal Sclera/Conjunctiva] : normal sclera/conjunctiva [EOMI] : extra ocular movement intact [No Proptosis] : no proptosis [No Lid Lag] : no lid lag [No LAD] : no lymphadenopathy [Supple] : the neck was supple [Thyroid Not Enlarged] : the thyroid was not enlarged [No Thyroid Nodules] : no palpable thyroid nodules [No Respiratory Distress] : no respiratory distress [No Accessory Muscle Use] : no accessory muscle use [Normal Rate and Effort] : normal respiratory rate and effort [Clear to Auscultation] : lungs were clear to auscultation bilaterally [No Edema] : no peripheral edema [Normal Bowel Sounds] : normal bowel sounds [Not Tender] : non-tender [Not Distended] : not distended [Soft] : abdomen soft [No Stigmata of Cushings Syndrome] : no stigmata of Cushings Syndrome [Normal Gait] : normal gait [No Clubbing, Cyanosis] : no clubbing  or cyanosis of the fingernails [No Rash] : no rash [Abdominal Striae] : no abdominal striae [Acanthosis Nigricans] : no acanthosis nigricans [Acne] : no acne [Normal Reflexes] : deep tendon reflexes were 2+ and symmetric [No Tremors] : no tremors [Oriented x3] : oriented to person, place, and time

## 2022-02-03 NOTE — ASSESSMENT
[FreeTextEntry1] : 1. Type 2 DM\par POC HgbA1c today is 8.2%, not yet at goal\par Continue Metformin 1000mg po BID\par Continue Jardiance 10mg daily\par Given samples 4 boxes of Jardiance 10mg daily today \par Will increase Jardiance to 25mg daily by next visit if no further improvement\par Extensive discussion with patient today regarding diabetic diet \par \par 2. h/o primary hyperparathyroidism\par noted mild hypercalcemia\par not candidate for medical or surgical management at this time\par will repeat 24 hr urine calcium collection at next visit\par \par Answered all questions; patient verbalized understanding\par RTC in 3 months [Diabetes Foot Care] : diabetes foot care [Long Term Vascular Complications] : long term vascular complications of diabetes [Carbohydrate Consistent Diet] : carbohydrate consistent diet [Importance of Diet and Exercise] : importance of diet and exercise to improve glycemic control, achieve weight loss and improve cardiovascular health [Hypoglycemia Management] : hypoglycemia management [Retinopathy Screening] : Patient was referred to ophthalmology for retinopathy screening [Weight Loss] : weight loss [Diabetic Medications] : Risks and benefits of diabetic medications were discussed

## 2022-02-03 NOTE — HISTORY OF PRESENT ILLNESS
[FreeTextEntry1] : This is a 82 yo male with past medical history of CVA in 2019, HTN, HLD, hydrocephalus, MICK, normocytic anemia, BPH, type 2 DM, first degree AV block who presents for diabetes follow up\par \par He was last seen for diabetes initial visit in Nov 2021 at which time HgbA1c was noted 8.0% and he was recommended to start on Farxiga 5mg daily and continue Metformin. Soon after visit, it was noted Farxiga was too expensive/not covered and Jardiance was alternatively prescribed. Patient notes Jardiance is also a little expensive ($219 out of pocket), but he has been taking it and noted improvement in his glucose since on the medication. He ran out of jardiance 2 weeks ago. \par He checks fingerstick glucose 1x/day, notes AM fasting ranges from 119-146. He denies hypoglycemia. He is sleeping late, stays up late at night .\par \par Regarding h/o mild hypercalcemia from primary hyperparathyroidism- he has no h/o nephrolithiasis, fragility fractures, or osteoporosis, instructed to do 24hr urine calcium, avoid calcium supplements and ensure adequate hydration.

## 2022-02-03 NOTE — CONSULT LETTER
[facility] Medicine Services Daily Progress Note    Patient Name: Katie Reddy  : 1948  MRN: [id number]  Primary Care Physician:  Brooklyn Contreras DO  Date of admission: 2022      Subjective      Chief Complaint: Epigastric pain and chest pain with rectal bleeding    Patient Reports   2/3/2022: Patient reports some improvement in her abdominal discomfort which she describes as a pressure or tightness that moves fromright to left across her upper abdomen.  She reports two episodes of bleeding per rectum with scant clots but no stool accompanying the blood.  No complaints of fever, chills, sweats, nausea, vomiting or  complaints.      ROS   12 point review of systems was reviewed and was negative except 2 episodes of bleeding per rectum and some abdominal discomfort (patient denies abdominal pain or chest pain).      Objective      Vitals:   Temp:  [97.7 °F (36.5 °C)-98.5 °F (36.9 °C)] 98 °F (36.7 °C)  Heart Rate:  [] 121  Resp:  [16-18] 18  BP: (106-185)/(62-96) 184/96    Physical Exam   Vital signs and nurses notes reviewed.  Well-developed well-nourished elderly female in no acute distress sitting up in bed awake and alert; mucous membranes moist; sclerae anicteric; lungs clear to auscultation bilaterally; CV regular rate and rhythm; abdomen soft nontender nondistended with active bowel sounds and no masses; extremities with no edema, cyanosis or calf tenderness; palpable pedal pulses bilaterally; no Monahan catheter.       Result Review    Result Review:  I have personally reviewed the results from the time of this admission to 2022 10:10 EST and agree with these findings:  [x]  Laboratory  [x]  Microbiology  [x]  Radiology  [x]  EKG/Telemetry   [x]  Cardiology/Vascular   []  Pathology  [x]  Old records  []  Other:  Most notable findings discussed in the assessment and plan.    Wounds (last 24 hours)             Assessment/Plan      Brief Patient Summary:  Katie FALCON  Maureen is a 73 y.o. female with a history of HTN, GERD, anxiety, asthma, CVA hemorrhagic, adrenal insufficiency s/p loop recorder placement approximately 1 year ago, migraines, and arthritis who presented to [facility] on 2/1/2022 complaining of a 2-week history chest pain, epigastric pain, and rectal bleeding.   CT scan of the abdomen and pelvis showed thickened wall of the left colon consistent with infection or inflammation.      Current inpatient medications include:  amitriptyline, 25 mg, Oral, Nightly  amLODIPine, 10 mg, Oral, Daily  atorvastatin, 80 mg, Oral, Nightly  hydrocortisone, 10 mg, Oral, TID With Meals  hydrocortisone, 1 application, Topical, BID  lisinopril, 5 mg, Oral, Daily  metoclopramide, 10 mg, Intravenous, 4x Daily AC & at Bedtime  pantoprazole, 40 mg, Intravenous, BID AC  piperacillin-tazobactam, 3.375 g, Intravenous, Q8H  polyethylene glycol, 17 g, Oral, BID With Meals  Scopolamine, 1 patch, Transdermal, Q72H  sertraline, 100 mg, Oral, Nightly  sodium chloride, 10 mL, Intravenous, Q12H  sucralfate, 1 g, Oral, BID             Active Hospital Problems:  Active Hospital Problems    Diagnosis    • **Chest pain    • Colitis    • 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)    • 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    • Asthma in adult, mild intermittent, uncomplicated    • Essential hypertension    • Dyslipidemia    • GERD (gastroesophageal reflux disease)    • Vitamin D deficiency    • Generalized anxiety disorder      Plan:   Lower GI bleed secondary to colitis, probable ischemia versus infectious etiology  -CT abdomen pelvis with inflammation of the left colon  -Blood cultures x2 no growth and pending  -No stool sample for GI panel or Hemoccult  -Continue Zosyn  -IV fluids ordered for hydration  -Low residue diet as tolerated    Acute blood loss anemia from GI bleeding, mild  -Hemoglobin on admission  [FreeTextEntry1] : Dear ,\par \par I had the pleasure of evaluating your patient, ADAMS CORONA today in pulmonary consultation.  Please refer to my attached note for my findings and recommendations.\par \par \par Thank you for allowing me to participate in the care of your patient, please feel free to call with any questions or concerns.\par \par \par Sincerely,\par \par Beata Acharya MD\par [facility] \par [facility]\par \par  12.9 and follow-up 10.4  -IV iron ordered  -Monitor hemoglobin  -GI referral as an outpatient    Chronic constipation  -Patient recently started on Linzess but had not taken it yet (nonformulary); Movantik ordered in its place  -Polyethylene glycol ordered  -Patient refused sorbitol or mag citrate because it makes her vomit    Glucocorticoid deficiency adrenal insufficiency  -Chronic  -No blood pressure drop with orthostatic position change  -Continue hydrocortisone  -Patient followed by Dr. Gerard outpatient    Noncardiac chest pain likely GI etiology due to achalasia  -Troponin x2 negative  - EKG with sinus tachycardia with ST elevation due to nonspecific intraventricular conduction delay     Essential hypertension, controlled  -Chronic  -Continue home amlodipine and lisinopril      Hyperlipidemia  -Chronic-Continue home atorvastatin      Asthma in adult, mild intermittent, uncomplicated  -Continue home rescue inhaler as needed for wheezing/shortness of breath     Vitamin D deficiency  -Chronic  -Continue home supplement      Generalized anxiety disorder/depression/insomnia  -Continue home temazepam, sertraline, and amitriptyline pending home med rec verification     GERD  -Continue home sucralfate and Pepcid      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  -Patient has had a chronic headache, dizziness and weakness  -Patient reports chronic swelling left face left upper and lower extremity since her stroke  -Continue home meclizine as needed     Incontinence  -Bowel and bladder  -Monitor I's and O's  -Provide incontinence skin care monitor for skin breakdown  -Patient has bladder stimulator     Osteoporosis  -Patient on home Boniva-encourage compliance       DVT prophylaxis:  Mechanical DVT prophylaxis orders are present.    CODE STATUS:    Medical Intervention Limits: NO intubation (DNI)  Code Status (Patient has no pulse and is not breathing): No CPR (Do Not Attempt to  Resuscitate)  Medical Interventions (Patient has pulse or is breathing): Limited Support      Disposition:  I expect patient to be discharged in 2-3 days.    This patient has been examined wearing appropriate Personal Protective Equipment and discussed with hospital infection control department. 02/04/22      Electronically signed by Sarina Bates MD, 02/04/22, 10:10 EST.  [facility]ist Team

## 2022-02-21 ENCOUNTER — RX RENEWAL (OUTPATIENT)
Age: 82
End: 2022-02-21

## 2022-03-21 RX ORDER — DAPAGLIFLOZIN 5 MG/1
5 TABLET, FILM COATED ORAL
Qty: 90 | Refills: 1 | Status: DISCONTINUED | COMMUNITY
Start: 2021-11-23 | End: 2022-03-21

## 2022-03-27 ENCOUNTER — RX RENEWAL (OUTPATIENT)
Age: 82
End: 2022-03-27

## 2022-04-26 ENCOUNTER — RX RENEWAL (OUTPATIENT)
Age: 82
End: 2022-04-26

## 2022-04-29 ENCOUNTER — NON-APPOINTMENT (OUTPATIENT)
Age: 82
End: 2022-04-29

## 2022-05-03 ENCOUNTER — APPOINTMENT (OUTPATIENT)
Dept: ENDOCRINOLOGY | Facility: CLINIC | Age: 82
End: 2022-05-03
Payer: MEDICARE

## 2022-05-03 VITALS
DIASTOLIC BLOOD PRESSURE: 68 MMHG | WEIGHT: 163 LBS | BODY MASS INDEX: 26.2 KG/M2 | HEART RATE: 74 BPM | HEIGHT: 66 IN | OXYGEN SATURATION: 97 % | SYSTOLIC BLOOD PRESSURE: 118 MMHG

## 2022-05-03 LAB
GLUCOSE BLDC GLUCOMTR-MCNC: 161
HBA1C MFR BLD HPLC: 7.5

## 2022-05-03 PROCEDURE — 99213 OFFICE O/P EST LOW 20 MIN: CPT | Mod: 25

## 2022-05-03 PROCEDURE — 83036 HEMOGLOBIN GLYCOSYLATED A1C: CPT | Mod: QW

## 2022-05-03 PROCEDURE — 36415 COLL VENOUS BLD VENIPUNCTURE: CPT

## 2022-05-03 PROCEDURE — 82962 GLUCOSE BLOOD TEST: CPT

## 2022-05-04 LAB
25(OH)D3 SERPL-MCNC: 25.9 NG/ML
ALBUMIN SERPL ELPH-MCNC: 4.3 G/DL
ALP BLD-CCNC: 61 U/L
ALT SERPL-CCNC: 15 U/L
ANION GAP SERPL CALC-SCNC: 15 MMOL/L
AST SERPL-CCNC: 18 U/L
BILIRUB SERPL-MCNC: 0.3 MG/DL
BUN SERPL-MCNC: 20 MG/DL
CALCIUM SERPL-MCNC: 10.8 MG/DL
CALCIUM SERPL-MCNC: 10.8 MG/DL
CHLORIDE SERPL-SCNC: 97 MMOL/L
CHOLEST SERPL-MCNC: 146 MG/DL
CO2 SERPL-SCNC: 29 MMOL/L
CREAT SERPL-MCNC: 1.56 MG/DL
EGFR: 44 ML/MIN/1.73M2
GLUCOSE SERPL-MCNC: 167 MG/DL
HDLC SERPL-MCNC: 71 MG/DL
LDLC SERPL CALC-MCNC: 48 MG/DL
NONHDLC SERPL-MCNC: 75 MG/DL
PARATHYROID HORMONE INTACT: 63 PG/ML
POTASSIUM SERPL-SCNC: 4.3 MMOL/L
PROT SERPL-MCNC: 7.4 G/DL
SODIUM SERPL-SCNC: 141 MMOL/L
TRIGL SERPL-MCNC: 132 MG/DL

## 2022-05-05 NOTE — HISTORY OF PRESENT ILLNESS
[FreeTextEntry1] : ADAMS CORONA is a 80 yo male with past medical history of CVA in 2019, HTN, HLD, hydrocephalus, MICK, normocytic anemia, BPH, type 2 DM, first degree AV block, who presents for follow up of diabetes and hyperparathyroidism.\par \par Diagnosed with diabetes 20 years ago, taking Metformin 1000mg po BID and Jardiance 10mg daily. He checks AM  fasting ranges in 120s, denies any recent hypoglycemic episodes or symptoms. Notes fargixa is not covered by his insurance and Jardiance out of pocket cost is around $200...asking if any alternatives today. He refuses injectable therapies and prefers pills. He notes he recently had UTI and is being treated with antibiotics and scheduled to see urologist and PCP soon for follow up. He denies h/o of previous UTI or yeast infections.\par \par

## 2022-05-05 NOTE — ASSESSMENT
[FreeTextEntry1] : 1. Type 2 DM \par POC HgbA1c today is 7.5%, improved, but not yet at goal.\par POC glucose today is 161mg/dl.\par Reviewed home glucose monitoring log, AM fasting is acceptable, advised patient to also check fingersticks at bedtime or 2hr postprandial. He notes recent UTI being treated with oral antibiotics.\par Advised he should finish course of antibiotics and resume Jardiance. Advised patient should he experience recurrent UTI or yeast infections, we may need to discontinue Jardiance in future.\par Bloodwork was obtained in office today for CMP, lipid panel. \par Addendum: Reviewed today's results with patient via telephone: LDL is 48, continue Atorvastatin 10mg daily. Continue Lisinopril-HCTZ. Noted eGFR 44, advised to f/u nephrology.\par Continue Metformin 1000mg po BID \par Increased Jardiance to 25mg daily (Given 2 boxes of samples today)\par Patient not interested in GLP-1 agonist during our discussion today, may need to consider sulfonylurea/meglitinides in future if no further improvement.\par \par 2. h/o hyperparathyroidism with mild hypercalcemia\par patient denies h/o nephrolithiasis, fragility fractures or OTP\par 24hr urine calcium from Nov 2021 is not elevated.\par Patient is asymptomatic, not currently interested in surgery, not yet candidate for medical therapy.\par Reminded patient to avoid calcium supplements in vitamins/Tums,etc and to increase oral hydration\par Addendum: Discussed labs with patient via telephone: Ca 10.8, alb 4.3, corrected Calcium is 10.6.PTH 63, Ca 10.8, vitamin D 25 hydroxy is 25.9\par Advised to increase vitamin D3 supplementation to 2000IU po daily.\par \par Answered all questions today; patient verbalized understanding\par RTC in 3 months. [Diabetes Foot Care] : diabetes foot care [Long Term Vascular Complications] : long term vascular complications of diabetes [Carbohydrate Consistent Diet] : carbohydrate consistent diet [Importance of Diet and Exercise] : importance of diet and exercise to improve glycemic control, achieve weight loss and improve cardiovascular health [Hypoglycemia Management] : hypoglycemia management [Self Monitoring of Blood Glucose] : self monitoring of blood glucose [Retinopathy Screening] : Patient was referred to ophthalmology for retinopathy screening [Weight Loss] : weight loss [Diabetic Medications] : Risks and benefits of diabetic medications were discussed

## 2022-06-13 ENCOUNTER — RX RENEWAL (OUTPATIENT)
Age: 82
End: 2022-06-13

## 2022-06-19 ENCOUNTER — RX RENEWAL (OUTPATIENT)
Age: 82
End: 2022-06-19

## 2022-08-02 ENCOUNTER — APPOINTMENT (OUTPATIENT)
Dept: ENDOCRINOLOGY | Facility: CLINIC | Age: 82
End: 2022-08-02

## 2022-09-12 ENCOUNTER — APPOINTMENT (OUTPATIENT)
Dept: ENDOCRINOLOGY | Facility: CLINIC | Age: 82
End: 2022-09-12

## 2022-09-12 VITALS
HEART RATE: 57 BPM | HEIGHT: 66 IN | DIASTOLIC BLOOD PRESSURE: 72 MMHG | WEIGHT: 154 LBS | SYSTOLIC BLOOD PRESSURE: 140 MMHG | OXYGEN SATURATION: 87 % | BODY MASS INDEX: 24.75 KG/M2

## 2022-09-12 LAB
GLUCOSE BLDC GLUCOMTR-MCNC: 123
HBA1C MFR BLD HPLC: 7.7

## 2022-09-12 PROCEDURE — 83036 HEMOGLOBIN GLYCOSYLATED A1C: CPT | Mod: QW

## 2022-09-12 PROCEDURE — 99213 OFFICE O/P EST LOW 20 MIN: CPT | Mod: 25

## 2022-09-12 PROCEDURE — 82962 GLUCOSE BLOOD TEST: CPT

## 2022-09-12 PROCEDURE — 36415 COLL VENOUS BLD VENIPUNCTURE: CPT

## 2022-09-12 NOTE — PHYSICAL EXAM
[Alert] : alert [Well Nourished] : well nourished [No Acute Distress] : no acute distress [Normal Sclera/Conjunctiva] : normal sclera/conjunctiva [EOMI] : extra ocular movement intact [No Proptosis] : no proptosis [No Lid Lag] : no lid lag [No LAD] : no lymphadenopathy [Supple] : the neck was supple [Thyroid Not Enlarged] : the thyroid was not enlarged [No Thyroid Nodules] : no palpable thyroid nodules [No Respiratory Distress] : no respiratory distress [No Accessory Muscle Use] : no accessory muscle use [Normal Rate and Effort] : normal respiratory rate and effort [Clear to Auscultation] : lungs were clear to auscultation bilaterally [No Edema] : no peripheral edema [Normal Bowel Sounds] : normal bowel sounds [Not Tender] : non-tender [Not Distended] : not distended [Soft] : abdomen soft [No Stigmata of Cushings Syndrome] : no stigmata of Cushings Syndrome [Normal Gait] : normal gait [No Clubbing, Cyanosis] : no clubbing  or cyanosis of the fingernails [No Rash] : no rash [Normal Reflexes] : deep tendon reflexes were 2+ and symmetric [No Tremors] : no tremors [Oriented x3] : oriented to person, place, and time [Normal Voice/Communication] : normal voice communication [No Involuntary Movements] : no involuntary movements were seen [Abdominal Striae] : no abdominal striae [Acanthosis Nigricans] : no acanthosis nigricans [Acne] : no acne [Right foot was examined, including] : right foot ~C was examined, including visual inspection with sensory and pulse exams [Left foot was examined, including] : left foot ~C was examined, including visual inspection with sensory and pulse exams [Normal] : normal [2+] : 2+ in the dorsalis pedis [Diminished Throughout Both Feet] : normal tactile sensation with monofilament testing throughout both feet [Normal Sensation on Monofilament Testing] : normal sensation on monofilament testing of lower extremities [Normal Insight/Judgement] : insight and judgment were intact

## 2022-09-12 NOTE — ASSESSMENT
[Diabetes Foot Care] : diabetes foot care [Long Term Vascular Complications] : long term vascular complications of diabetes [Carbohydrate Consistent Diet] : carbohydrate consistent diet [Importance of Diet and Exercise] : importance of diet and exercise to improve glycemic control, achieve weight loss and improve cardiovascular health [Hypoglycemia Management] : hypoglycemia management [Self Monitoring of Blood Glucose] : self monitoring of blood glucose [Retinopathy Screening] : Patient was referred to ophthalmology for retinopathy screening [Weight Loss] : weight loss [Diabetic Medications] : Risks and benefits of diabetic medications were discussed [FreeTextEntry1] : 1. Type 2 DM \par POC HgbA1c today is 7.7%, not yet at goal.\par Reviewed home glucose monitoring log, AM fasting is acceptable, discussed target glucose goals for fasting and postprandial.  Insurance previously did not cover for Farxiga, recently was given 10 mg dose of Jardiance even though he was tolerating 25 mg samples \par Continue Metformin 1000mg po BID \par Sent new prescription for Jardiance 25mg daily\par Last LDL is 48, continue Atorvastatin 10mg daily. Continue Lisinopril-HCTZ. Noted previous eGFR 44, put in new referral for nephrology due to concern for CKD.  \par Patient not interested in injectable medications including GLP-1 agonist during our discussion today, will consider sulfonylurea/meglitinides if no further improvement at next visit.\par \par 2. h/o hyperparathyroidism with mild hypercalcemia\par patient denies h/o nephrolithiasis, fragility fractures or osteoporosis\par Noted 24hr urine calcium from Nov 2021 is not elevated.\par Patient is asymptomatic, not currently interested in surgery, not candidate for medical therapy.\par Reminded patient to avoid calcium supplements in vitamins/Tums,etc and to increase oral hydration\par \par Answered all questions today; patient verbalized understanding\par RTC in 3 months.

## 2022-09-12 NOTE — HISTORY OF PRESENT ILLNESS
[FreeTextEntry1] : ADAMS CORONA is a 81 yo male with past medical history of CVA in 2019, HTN, HLD, hydrocephalus, MICK, normocytic anemia, BPH, type 2 DM, first degree AV block, who presents for follow up of diabetes and hyperparathyroidism.\par \par Diagnosed with diabetes 20 years ago, at last endocrinology visit in May 2022 patient was continued on Metformin 1000mg po BID and increased Jardiance to 25mg daily. He checks AM  fasting ranges in 120s, denies any recent hypoglycemic episodes or symptoms.  In the past it was noted that Farxiga was not covered by his insurance and and had a high out-of-pocket cost for Jardiance due to high deductible insurance.  Patient notes he finished his Jardiance 25 mg sample after last visit and the pharmacy has been dispensing the Jardiance 10 mg dose from before.  Jardiance out of pocket.. He denies h/o of previous UTI or yeast infections.\par \par He last saw ophthalmology in June 2022, no history of diabetic retinopathy. He notes he lost 10lbs since last visit

## 2022-09-13 LAB
ALBUMIN SERPL ELPH-MCNC: 4.5 G/DL
ALP BLD-CCNC: 63 U/L
ALT SERPL-CCNC: 16 U/L
ANION GAP SERPL CALC-SCNC: 13 MMOL/L
AST SERPL-CCNC: 19 U/L
BILIRUB SERPL-MCNC: 0.4 MG/DL
BUN SERPL-MCNC: 16 MG/DL
CALCIUM SERPL-MCNC: 11 MG/DL
CHLORIDE SERPL-SCNC: 96 MMOL/L
CO2 SERPL-SCNC: 27 MMOL/L
CREAT SERPL-MCNC: 1.35 MG/DL
EGFR: 52 ML/MIN/1.73M2
GLUCOSE SERPL-MCNC: 136 MG/DL
POTASSIUM SERPL-SCNC: 3.9 MMOL/L
PROT SERPL-MCNC: 7.3 G/DL
SODIUM SERPL-SCNC: 137 MMOL/L

## 2022-09-18 ENCOUNTER — RX RENEWAL (OUTPATIENT)
Age: 82
End: 2022-09-18

## 2022-09-30 ENCOUNTER — APPOINTMENT (OUTPATIENT)
Dept: INTERNAL MEDICINE | Facility: CLINIC | Age: 82
End: 2022-09-30

## 2022-09-30 ENCOUNTER — NON-APPOINTMENT (OUTPATIENT)
Age: 82
End: 2022-09-30

## 2022-09-30 VITALS
HEIGHT: 66 IN | WEIGHT: 155 LBS | BODY MASS INDEX: 24.91 KG/M2 | OXYGEN SATURATION: 99 % | DIASTOLIC BLOOD PRESSURE: 71 MMHG | SYSTOLIC BLOOD PRESSURE: 120 MMHG | HEART RATE: 73 BPM | TEMPERATURE: 97.3 F | RESPIRATION RATE: 16 BRPM

## 2022-09-30 DIAGNOSIS — Z00.00 ENCOUNTER FOR GENERAL ADULT MEDICAL EXAMINATION W/OUT ABNORMAL FINDINGS: ICD-10-CM

## 2022-09-30 DIAGNOSIS — G91.9 HYDROCEPHALUS, UNSPECIFIED: ICD-10-CM

## 2022-09-30 DIAGNOSIS — Z23 ENCOUNTER FOR IMMUNIZATION: ICD-10-CM

## 2022-09-30 DIAGNOSIS — Z12.83 ENCOUNTER FOR SCREENING FOR MALIGNANT NEOPLASM OF SKIN: ICD-10-CM

## 2022-09-30 DIAGNOSIS — E34.9 ENDOCRINE DISORDER, UNSPECIFIED: ICD-10-CM

## 2022-09-30 DIAGNOSIS — Z87.09 PERSONAL HISTORY OF OTHER DISEASES OF THE RESPIRATORY SYSTEM: ICD-10-CM

## 2022-09-30 DIAGNOSIS — E55.9 VITAMIN D DEFICIENCY, UNSPECIFIED: ICD-10-CM

## 2022-09-30 PROCEDURE — G0439: CPT

## 2022-09-30 PROCEDURE — 93000 ELECTROCARDIOGRAM COMPLETE: CPT

## 2022-10-03 LAB
25(OH)D3 SERPL-MCNC: 44.8 NG/ML
APPEARANCE: CLEAR
BACTERIA: NEGATIVE
BASOPHILS # BLD AUTO: 0.04 K/UL
BASOPHILS NFR BLD AUTO: 0.9 %
BILIRUBIN URINE: NEGATIVE
BLOOD URINE: NEGATIVE
CHOLEST SERPL-MCNC: 142 MG/DL
COLOR: NORMAL
EOSINOPHIL # BLD AUTO: 0.09 K/UL
EOSINOPHIL NFR BLD AUTO: 2 %
FERRITIN SERPL-MCNC: 41 NG/ML
FOLATE SERPL-MCNC: >20 NG/ML
GLUCOSE QUALITATIVE U: ABNORMAL
HCT VFR BLD CALC: 39.4 %
HDLC SERPL-MCNC: 70 MG/DL
HGB BLD-MCNC: 13.7 G/DL
HYALINE CASTS: 0 /LPF
IMM GRANULOCYTES NFR BLD AUTO: 0.2 %
IRON SATN MFR SERPL: 31 %
IRON SERPL-MCNC: 113 UG/DL
KETONES URINE: NEGATIVE
LDLC SERPL CALC-MCNC: 60 MG/DL
LEUKOCYTE ESTERASE URINE: NEGATIVE
LYMPHOCYTES # BLD AUTO: 1.98 K/UL
LYMPHOCYTES NFR BLD AUTO: 44.4 %
MAN DIFF?: NORMAL
MCHC RBC-ENTMCNC: 33.2 PG
MCHC RBC-ENTMCNC: 34.8 GM/DL
MCV RBC AUTO: 95.4 FL
MICROSCOPIC-UA: NORMAL
MONOCYTES # BLD AUTO: 0.43 K/UL
MONOCYTES NFR BLD AUTO: 9.6 %
NEUTROPHILS # BLD AUTO: 1.91 K/UL
NEUTROPHILS NFR BLD AUTO: 42.9 %
NITRITE URINE: NEGATIVE
NONHDLC SERPL-MCNC: 72 MG/DL
PH URINE: 6
PLATELET # BLD AUTO: 187 K/UL
PROTEIN URINE: NEGATIVE
RBC # BLD: 4.13 M/UL
RBC # FLD: 14 %
RED BLOOD CELLS URINE: 0 /HPF
SPECIFIC GRAVITY URINE: 1.01
SQUAMOUS EPITHELIAL CELLS: 0 /HPF
TIBC SERPL-MCNC: 368 UG/DL
TRIGL SERPL-MCNC: 57 MG/DL
TSH SERPL-ACNC: 3.37 UIU/ML
UIBC SERPL-MCNC: 255 UG/DL
UROBILINOGEN URINE: NORMAL
VIT B12 SERPL-MCNC: 1997 PG/ML
WBC # FLD AUTO: 4.46 K/UL
WHITE BLOOD CELLS URINE: 0 /HPF

## 2022-10-05 LAB — BACTERIA UR CULT: NORMAL

## 2022-12-08 ENCOUNTER — APPOINTMENT (OUTPATIENT)
Dept: ENDOCRINOLOGY | Facility: CLINIC | Age: 82
End: 2022-12-08

## 2022-12-19 ENCOUNTER — RX RENEWAL (OUTPATIENT)
Age: 82
End: 2022-12-19

## 2023-02-27 ENCOUNTER — RX RENEWAL (OUTPATIENT)
Age: 83
End: 2023-02-27

## 2023-03-02 ENCOUNTER — RX RENEWAL (OUTPATIENT)
Age: 83
End: 2023-03-02

## 2023-03-21 ENCOUNTER — RX RENEWAL (OUTPATIENT)
Age: 83
End: 2023-03-21

## 2023-03-25 ENCOUNTER — RX RENEWAL (OUTPATIENT)
Age: 83
End: 2023-03-25

## 2023-04-10 ENCOUNTER — APPOINTMENT (OUTPATIENT)
Dept: INTERNAL MEDICINE | Facility: CLINIC | Age: 83
End: 2023-04-10
Payer: MEDICARE

## 2023-04-10 ENCOUNTER — NON-APPOINTMENT (OUTPATIENT)
Age: 83
End: 2023-04-10

## 2023-04-10 VITALS
WEIGHT: 154 LBS | HEIGHT: 66 IN | TEMPERATURE: 98 F | BODY MASS INDEX: 24.75 KG/M2 | DIASTOLIC BLOOD PRESSURE: 63 MMHG | SYSTOLIC BLOOD PRESSURE: 107 MMHG | OXYGEN SATURATION: 99 % | HEART RATE: 69 BPM

## 2023-04-10 VITALS
DIASTOLIC BLOOD PRESSURE: 50 MMHG | HEART RATE: 69 BPM | TEMPERATURE: 95 F | WEIGHT: 154 LBS | HEIGHT: 66 IN | OXYGEN SATURATION: 99 % | SYSTOLIC BLOOD PRESSURE: 95 MMHG | BODY MASS INDEX: 24.75 KG/M2

## 2023-04-10 DIAGNOSIS — J03.00 ACUTE STREPTOCOCCAL TONSILLITIS, UNSPECIFIED: ICD-10-CM

## 2023-04-10 DIAGNOSIS — R42 DIZZINESS AND GIDDINESS: ICD-10-CM

## 2023-04-10 PROCEDURE — 99214 OFFICE O/P EST MOD 30 MIN: CPT

## 2023-04-13 ENCOUNTER — NON-APPOINTMENT (OUTPATIENT)
Age: 83
End: 2023-04-13

## 2023-04-25 ENCOUNTER — NON-APPOINTMENT (OUTPATIENT)
Age: 83
End: 2023-04-25

## 2023-04-25 ENCOUNTER — APPOINTMENT (OUTPATIENT)
Dept: CARDIOLOGY | Facility: CLINIC | Age: 83
End: 2023-04-25
Payer: MEDICARE

## 2023-04-25 VITALS
SYSTOLIC BLOOD PRESSURE: 100 MMHG | DIASTOLIC BLOOD PRESSURE: 62 MMHG | HEART RATE: 71 BPM | HEIGHT: 66 IN | OXYGEN SATURATION: 97 % | BODY MASS INDEX: 23.78 KG/M2 | WEIGHT: 148 LBS

## 2023-04-25 DIAGNOSIS — I95.9 HYPOTENSION, UNSPECIFIED: ICD-10-CM

## 2023-04-25 DIAGNOSIS — Z86.79 PERSONAL HISTORY OF OTHER DISEASES OF THE CIRCULATORY SYSTEM: ICD-10-CM

## 2023-04-25 PROCEDURE — 99215 OFFICE O/P EST HI 40 MIN: CPT

## 2023-04-25 PROCEDURE — 93000 ELECTROCARDIOGRAM COMPLETE: CPT

## 2023-04-25 PROCEDURE — 93306 TTE W/DOPPLER COMPLETE: CPT

## 2023-04-25 RX ORDER — AMLODIPINE BESYLATE 10 MG/1
10 TABLET ORAL
Qty: 90 | Refills: 0 | Status: DISCONTINUED | COMMUNITY
Start: 2021-01-29 | End: 2023-04-25

## 2023-04-25 RX ORDER — DONEPEZIL HYDROCHLORIDE 23 MG/1
TABLET, FILM COATED ORAL
Refills: 0 | Status: ACTIVE | COMMUNITY

## 2023-04-25 NOTE — REVIEW OF SYSTEMS
[Feeling Fatigued] : feeling fatigued [Weight Loss (___ Lbs)] : [unfilled] ~Ulb weight loss [SOB] : shortness of breath [Dyspnea on exertion] : dyspnea during exertion [Chest Discomfort] : no chest discomfort [Lower Ext Edema] : no extremity edema

## 2023-04-25 NOTE — CARDIOLOGY SUMMARY
[de-identified] : April 25, 2023 sinus rhythm fragmented QRS in the inferior leads [de-identified] : 2021 [de-identified] : 2021 mild LVH normal LV systolic function

## 2023-04-25 NOTE — PHYSICAL EXAM
[Normal] : soft, non-tender, no masses/organomegaly, normal bowel sounds [No Edema] : no edema [Normal S1, S2] : normal S1, S2 [Murmur] : murmur [de-identified] : apical systolic murmur

## 2023-04-25 NOTE — HISTORY OF PRESENT ILLNESS
[FreeTextEntry1] : Patient seen on referral of his primary physician.  Prior history of stroke and hypertension.\par \par He was seen in urgent care and in the emergency room recently.  He had hypotension in setting of apparent respiratory infection.  Notes from [facility] upon discharge reviewed.\par \par He currently has fatigue and shortness of breath with exertion.  He is aware of weight loss.  He denied chest pain or palpitations.  He has fatigue more so than usual.  Med list updated.

## 2023-04-25 NOTE — DISCUSSION/SUMMARY
[EKG obtained to assist in diagnosis and management of assessed problem(s)] : EKG obtained to assist in diagnosis and management of assessed problem(s) [FreeTextEntry1] : Discussed with patient discontinue amlodipine obtain echo and nuclear stress study and follow-up.

## 2023-04-25 NOTE — ASSESSMENT
[FreeTextEntry1] : 82-year-old man with prior stroke history of hypertension and LVH, diabetes with weight loss relative hypotension fatigue and dyspnea on exertion.  Renal insufficiency.

## 2023-04-25 NOTE — REASON FOR VISIT
[Symptom and Test Evaluation] : symptom and test evaluation [Hypertension] : hypertension [FreeTextEntry3] : Branden

## 2023-05-01 ENCOUNTER — APPOINTMENT (OUTPATIENT)
Dept: UROLOGY | Facility: CLINIC | Age: 83
End: 2023-05-01
Payer: MEDICARE

## 2023-05-01 VITALS
HEART RATE: 80 BPM | OXYGEN SATURATION: 97 % | DIASTOLIC BLOOD PRESSURE: 68 MMHG | HEIGHT: 66 IN | BODY MASS INDEX: 23.78 KG/M2 | SYSTOLIC BLOOD PRESSURE: 115 MMHG | WEIGHT: 148 LBS

## 2023-05-01 DIAGNOSIS — R35.1 NOCTURIA: ICD-10-CM

## 2023-05-01 PROCEDURE — 99204 OFFICE O/P NEW MOD 45 MIN: CPT

## 2023-05-01 NOTE — PHYSICAL EXAM
[Abdomen Soft] : soft [Abdomen Tenderness] : non-tender [Costovertebral Angle Tenderness] : no ~M costovertebral angle tenderness [General Appearance - Well Developed] : well developed [General Appearance - Well Nourished] : well nourished [Normal Appearance] : normal appearance [Well Groomed] : well groomed [General Appearance - In No Acute Distress] : no acute distress [Edema] : no peripheral edema [Respiration, Rhythm And Depth] : normal respiratory rhythm and effort [Exaggerated Use Of Accessory Muscles For Inspiration] : no accessory muscle use [FreeTextEntry1] : due to void 250 ml with TUR defect seen ,office yulisa left with mp cyst and right parapelvic cyst, no hydro or masses or stones grossly seen  [Normal Station and Gait] : the gait and station were normal for the patient's age [] : no rash [No Focal Deficits] : no focal deficits [Oriented To Time, Place, And Person] : oriented to person, place, and time [Affect] : the affect was normal [Mood] : the mood was normal [Not Anxious] : not anxious [No Palpable Adenopathy] : no palpable adenopathy

## 2023-05-01 NOTE — ASSESSMENT
[FreeTextEntry1] : patient last seen by colleague dr joy 2016 for nocturia\par psa at that time 3.2 and was on vesicare\par hx of TURP \par recent urine ( Oct 2022) with glucosuria likely related to DM meds \par hx of abd US ( 2021) no hydro of kidneys bilat with pre void 432 ml and pvr of 74 ml\par no voiding c/o a bother- nocturia 3 x , good flow , some INC but no pads\par clear urine, no blood or pain with void, steady flow and feels empty after void \par here for ' check up' as prior  no longer takes insurance:\par 1- UROFLOW: voided 240 ml at 15 ml /sec in bell pattern in 20 sec with pvr 10 ml \par 2- discussed psa testingnot done after 67 y.o if no fam hx and normal 7 years ago \par 3- urne with glucose which is likely related to metformin use\par follow up as needed\par

## 2023-05-01 NOTE — HISTORY OF PRESENT ILLNESS
[FreeTextEntry1] : patient last seen by colleague dr joy 2016 for nocturia\par psa at that time 3.2 and was on vesicare\par hx of TURP \par recent urine ( Oct 2022) with glucosuria likely related to DM meds \par hx of abd US ( 2021) no hydro of kidneys bilat with pre void 432 ml and pvr of 74 ml\par no voiding c/o a bother- nocturia 3 x , good flow , some INC but no pads\par clear urine, no blood or pain with void, steady flow and feels empty after void \par here for ' check up' as prior  no longer takes insurance:

## 2023-05-09 ENCOUNTER — NON-APPOINTMENT (OUTPATIENT)
Age: 83
End: 2023-05-09

## 2023-05-12 ENCOUNTER — NON-APPOINTMENT (OUTPATIENT)
Age: 83
End: 2023-05-12

## 2023-05-22 ENCOUNTER — APPOINTMENT (OUTPATIENT)
Dept: CARDIOLOGY | Facility: CLINIC | Age: 83
End: 2023-05-22
Payer: MEDICARE

## 2023-05-22 PROCEDURE — A9500: CPT

## 2023-05-22 PROCEDURE — 93015 CV STRESS TEST SUPVJ I&R: CPT

## 2023-05-22 PROCEDURE — 78452 HT MUSCLE IMAGE SPECT MULT: CPT

## 2023-05-23 RX ORDER — EMPAGLIFLOZIN 25 MG/1
25 TABLET, FILM COATED ORAL
Qty: 90 | Refills: 1 | Status: ACTIVE | COMMUNITY
Start: 2021-11-29 | End: 1900-01-01

## 2023-05-25 ENCOUNTER — NON-APPOINTMENT (OUTPATIENT)
Age: 83
End: 2023-05-25

## 2023-05-30 ENCOUNTER — APPOINTMENT (OUTPATIENT)
Dept: INTERNAL MEDICINE | Facility: CLINIC | Age: 83
End: 2023-05-30

## 2023-06-05 ENCOUNTER — APPOINTMENT (OUTPATIENT)
Dept: INTERNAL MEDICINE | Facility: CLINIC | Age: 83
End: 2023-06-05
Payer: MEDICARE

## 2023-06-05 VITALS
WEIGHT: 149 LBS | OXYGEN SATURATION: 100 % | HEART RATE: 73 BPM | TEMPERATURE: 97.3 F | BODY MASS INDEX: 23.95 KG/M2 | SYSTOLIC BLOOD PRESSURE: 104 MMHG | DIASTOLIC BLOOD PRESSURE: 65 MMHG | HEIGHT: 66 IN

## 2023-06-05 PROCEDURE — 99214 OFFICE O/P EST MOD 30 MIN: CPT

## 2023-06-05 RX ORDER — METFORMIN HYDROCHLORIDE 500 MG/1
500 TABLET, COATED ORAL
Qty: 270 | Refills: 0 | Status: DISCONTINUED | COMMUNITY
Start: 2022-01-03 | End: 2023-06-05

## 2023-06-05 RX ORDER — ESCITALOPRAM OXALATE 10 MG/1
10 TABLET ORAL
Refills: 0 | Status: DISCONTINUED | COMMUNITY
Start: 2021-02-23 | End: 2023-06-05

## 2023-06-05 RX ORDER — TAMSULOSIN HYDROCHLORIDE 0.4 MG/1
0.4 CAPSULE ORAL
Qty: 90 | Refills: 0 | Status: DISCONTINUED | COMMUNITY
Start: 2021-04-13 | End: 2023-06-05

## 2023-06-05 RX ORDER — DONEPEZIL HYDROCHLORIDE 23 MG/1
23 TABLET, FILM COATED ORAL DAILY
Refills: 0 | Status: ACTIVE | COMMUNITY

## 2023-06-12 LAB
ALBUMIN SERPL ELPH-MCNC: 4.4 G/DL
ALP BLD-CCNC: 62 U/L
ALT SERPL-CCNC: 20 U/L
ANION GAP SERPL CALC-SCNC: 14 MMOL/L
APPEARANCE: CLEAR
AST SERPL-CCNC: 20 U/L
BACTERIA: NEGATIVE /HPF
BILIRUB SERPL-MCNC: 0.3 MG/DL
BILIRUBIN URINE: NEGATIVE
BLOOD URINE: NEGATIVE
BUN SERPL-MCNC: 32 MG/DL
CALCIUM SERPL-MCNC: 10.7 MG/DL
CAST: 0 /LPF
CHLORIDE SERPL-SCNC: 101 MMOL/L
CHOLEST SERPL-MCNC: 131 MG/DL
CO2 SERPL-SCNC: 25 MMOL/L
COLOR: YELLOW
CREAT SERPL-MCNC: 1.47 MG/DL
CREAT SPEC-SCNC: 49 MG/DL
EGFR: 47 ML/MIN/1.73M2
EPITHELIAL CELLS: 1 /HPF
ESTIMATED AVERAGE GLUCOSE: 166 MG/DL
FERRITIN SERPL-MCNC: 25 NG/ML
FOLATE SERPL-MCNC: 19.6 NG/ML
GLUCOSE QUALITATIVE U: >=1000 MG/DL
GLUCOSE SERPL-MCNC: 143 MG/DL
HBA1C MFR BLD HPLC: 7.4 %
HDLC SERPL-MCNC: 71 MG/DL
IRON SATN MFR SERPL: 21 %
IRON SERPL-MCNC: 70 UG/DL
KETONES URINE: NEGATIVE MG/DL
LDLC SERPL CALC-MCNC: 52 MG/DL
LEUKOCYTE ESTERASE URINE: NEGATIVE
MICROALBUMIN 24H UR DL<=1MG/L-MCNC: 1.4 MG/DL
MICROALBUMIN/CREAT 24H UR-RTO: 29 MG/G
MICROSCOPIC-UA: NORMAL
NITRITE URINE: NEGATIVE
NONHDLC SERPL-MCNC: 60 MG/DL
PH URINE: 6
POTASSIUM SERPL-SCNC: 4.4 MMOL/L
PROT SERPL-MCNC: 7.2 G/DL
PROTEIN URINE: NEGATIVE MG/DL
RED BLOOD CELLS URINE: 0 /HPF
SODIUM SERPL-SCNC: 140 MMOL/L
SPECIFIC GRAVITY URINE: 1.02
TIBC SERPL-MCNC: 339 UG/DL
TRIGL SERPL-MCNC: 42 MG/DL
TSH SERPL-ACNC: 2.8 UIU/ML
UIBC SERPL-MCNC: 270 UG/DL
UROBILINOGEN URINE: 0.2 MG/DL
VIT B12 SERPL-MCNC: 1835 PG/ML
WHITE BLOOD CELLS URINE: 1 /HPF

## 2023-06-14 ENCOUNTER — NON-APPOINTMENT (OUTPATIENT)
Age: 83
End: 2023-06-14

## 2023-06-14 ENCOUNTER — APPOINTMENT (OUTPATIENT)
Dept: INTERNAL MEDICINE | Facility: CLINIC | Age: 83
End: 2023-06-14
Payer: MEDICARE

## 2023-06-14 PROCEDURE — 99443: CPT

## 2023-06-22 ENCOUNTER — APPOINTMENT (OUTPATIENT)
Dept: PULMONOLOGY | Facility: CLINIC | Age: 83
End: 2023-06-22
Payer: MEDICARE

## 2023-06-22 VITALS
HEART RATE: 70 BPM | BODY MASS INDEX: 24.2 KG/M2 | DIASTOLIC BLOOD PRESSURE: 70 MMHG | OXYGEN SATURATION: 99 % | SYSTOLIC BLOOD PRESSURE: 145 MMHG | TEMPERATURE: 98 F | HEIGHT: 66 IN | RESPIRATION RATE: 15 BRPM | WEIGHT: 150.56 LBS

## 2023-06-22 PROCEDURE — 99203 OFFICE O/P NEW LOW 30 MIN: CPT

## 2023-06-22 NOTE — PHYSICAL EXAM
[No Acute Distress] : no acute distress [Well Nourished] : well nourished [Well Developed] : well developed [Normal Oropharynx] : normal oropharynx [I] : Mallampati Class: I [Normal Appearance] : normal appearance [No Neck Mass] : no neck mass [Normal Rate/Rhythm] : normal rate/rhythm [Normal S1, S2] : normal s1, s2 [No Murmurs] : no murmurs [No Resp Distress] : no resp distress [Clear to Auscultation Bilaterally] : clear to auscultation bilaterally [Not Tender] : not tender [No Masses] : no masses [Soft] : soft [No Clubbing] : no clubbing [No Edema] : no edema [No Focal Deficits] : no focal deficits [Oriented x3] : oriented x3 [Normal Affect] : normal affect [TextBox_68] : diminished aeration

## 2023-06-22 NOTE — DISCUSSION/SUMMARY
[FreeTextEntry1] : 83 year old man who has dyspnea on exertion \par \par He smoked only as a teen\par \par \par He had COVID-19 infection in 2020 and spent a few days in the ER.  he had mild interstitial pattern on CXR at the time\par \par he has clear lung exam and normal O2 saturation, 99%\par \par he is not anemic\par \par EST and echo were normal recently\par \par he has no pedal edema\par \par PLAN\par \par PFT and CXR\par \par Further recommendations based on results.\par \par Garfield Muro MD

## 2023-06-22 NOTE — HISTORY OF PRESENT ILLNESS
[TextBox_4] : 83 year old patient presents for evaluation of shortness of breath \par Denies chest pain/pressure, cough wheeze, sputum production, sore throat\par \par He has had symptom for several months\par \par It is not constant\par \par He is comfortable at rest\par he had a normal EST in 2021\par \par \par He had an episode of sinusitis about a month ago.  He still has rhinitis\par \par \par \par Primary doctor is DR Otero\par \par \par PSH:\par \par hernia\par \par cataract\par \par \par PMH:\par \par CKD\par anemia\par DM\par HTN\par \par HLD\par \par CVA\par \par BPH\par \par \par SH:\par \par former smoker  as teen \par \par \par ETOH:  rare\par \par \par Occupation:  \par \par No exposure to chemicals, dust, asbestos, mold\par \par \par \par ALLERGY:\par \par NKDA\par \par environmental/seasonal allergy:  none\par \par \par \par Review of Systems:\par \par \par no dysphagia\par no dry mouth\par \par \par no pneumonia\par \par no lung cancer\par \par no CAD\par no MI\par no chest pain\par no murmur\par no CHF\par no edema\par \par no peptic ulcer or gastritis\par no GERD\par no abdominal pain\par no liver disease\par \par no thyroid disease\par \par no bleeding\par \par no DVT or PE\par \par \par no seizure\par \par  history of COVID-19 infection \par \par \par \par \par \par \par \par \par \par   [YearQuit] : 1970 [TextBox_22] : smoked only as teen [Snoring] : no snoring [Unintentional Sleep while Active] : no unintentional sleep while active [Witnessed Apneas] : no witnessed apneas

## 2023-07-11 ENCOUNTER — APPOINTMENT (OUTPATIENT)
Dept: INTERNAL MEDICINE | Facility: CLINIC | Age: 83
End: 2023-07-11
Payer: MEDICARE

## 2023-07-11 DIAGNOSIS — R79.9 ABNORMAL FINDING OF BLOOD CHEMISTRY, UNSPECIFIED: ICD-10-CM

## 2023-07-11 DIAGNOSIS — D64.9 ANEMIA, UNSPECIFIED: ICD-10-CM

## 2023-07-11 DIAGNOSIS — E83.52 HYPERCALCEMIA: ICD-10-CM

## 2023-07-11 PROCEDURE — 99443: CPT

## 2023-07-13 LAB
ANION GAP SERPL CALC-SCNC: 12 MMOL/L
BUN SERPL-MCNC: 29 MG/DL
CALCIUM SERPL-MCNC: 10.9 MG/DL
CHLORIDE SERPL-SCNC: 99 MMOL/L
CO2 SERPL-SCNC: 25 MMOL/L
CREAT SERPL-MCNC: 1.47 MG/DL
EGFR: 46 ML/MIN/1.73M2
FERRITIN SERPL-MCNC: 30 NG/ML
FOLATE SERPL-MCNC: >20 NG/ML
GLUCOSE SERPL-MCNC: 133 MG/DL
IRON SATN MFR SERPL: 21 %
IRON SERPL-MCNC: 69 UG/DL
POTASSIUM SERPL-SCNC: 4.3 MMOL/L
SODIUM SERPL-SCNC: 136 MMOL/L
TIBC SERPL-MCNC: 334 UG/DL
UIBC SERPL-MCNC: 265 UG/DL
VIT B12 SERPL-MCNC: 814 PG/ML

## 2023-07-14 ENCOUNTER — NON-APPOINTMENT (OUTPATIENT)
Age: 83
End: 2023-07-14

## 2023-07-18 ENCOUNTER — LABORATORY RESULT (OUTPATIENT)
Age: 83
End: 2023-07-18

## 2023-07-20 ENCOUNTER — APPOINTMENT (OUTPATIENT)
Dept: GASTROENTEROLOGY | Facility: CLINIC | Age: 83
End: 2023-07-20

## 2023-07-31 LAB
ANION GAP SERPL CALC-SCNC: 15 MMOL/L
BUN SERPL-MCNC: 32 MG/DL
CALCIUM SERPL-MCNC: 11.1 MG/DL
CHLORIDE SERPL-SCNC: 98 MMOL/L
CO2 SERPL-SCNC: 26 MMOL/L
CREAT SERPL-MCNC: 1.46 MG/DL
EGFR: 47 ML/MIN/1.73M2
GLUCOSE SERPL-MCNC: 133 MG/DL
POTASSIUM SERPL-SCNC: 4 MMOL/L
SODIUM SERPL-SCNC: 140 MMOL/L

## 2023-08-10 ENCOUNTER — OUTPATIENT (OUTPATIENT)
Dept: OUTPATIENT SERVICES | Facility: HOSPITAL | Age: 83
LOS: 1 days | End: 2023-08-10
Payer: COMMERCIAL

## 2023-08-10 ENCOUNTER — APPOINTMENT (OUTPATIENT)
Dept: RADIOLOGY | Facility: CLINIC | Age: 83
End: 2023-08-10
Payer: MEDICARE

## 2023-08-10 DIAGNOSIS — R06.09 OTHER FORMS OF DYSPNEA: ICD-10-CM

## 2023-08-10 DIAGNOSIS — Z98.89 OTHER SPECIFIED POSTPROCEDURAL STATES: Chronic | ICD-10-CM

## 2023-08-10 PROCEDURE — 71046 X-RAY EXAM CHEST 2 VIEWS: CPT

## 2023-08-10 PROCEDURE — 71046 X-RAY EXAM CHEST 2 VIEWS: CPT | Mod: 26

## 2023-08-21 ENCOUNTER — APPOINTMENT (OUTPATIENT)
Dept: GASTROENTEROLOGY | Facility: CLINIC | Age: 83
End: 2023-08-21

## 2023-08-29 ENCOUNTER — NON-APPOINTMENT (OUTPATIENT)
Age: 83
End: 2023-08-29

## 2023-08-31 ENCOUNTER — APPOINTMENT (OUTPATIENT)
Dept: PULMONOLOGY | Facility: CLINIC | Age: 83
End: 2023-08-31
Payer: MEDICARE

## 2023-08-31 VITALS
HEIGHT: 66 IN | OXYGEN SATURATION: 99 % | SYSTOLIC BLOOD PRESSURE: 115 MMHG | HEART RATE: 72 BPM | DIASTOLIC BLOOD PRESSURE: 66 MMHG | WEIGHT: 150 LBS | BODY MASS INDEX: 24.11 KG/M2

## 2023-08-31 PROCEDURE — 99213 OFFICE O/P EST LOW 20 MIN: CPT

## 2023-08-31 NOTE — PHYSICAL EXAM
[No Acute Distress] : no acute distress [Well Nourished] : well nourished [Well Developed] : well developed [Normal Oropharynx] : normal oropharynx [I] : Mallampati Class: I [Normal Appearance] : normal appearance [No Neck Mass] : no neck mass [Normal Rate/Rhythm] : normal rate/rhythm [Normal S1, S2] : normal s1, s2 [No Murmurs] : no murmurs [No Resp Distress] : no resp distress [Clear to Auscultation Bilaterally] : clear to auscultation bilaterally [Not Tender] : not tender [No Masses] : no masses [Soft] : soft [No Clubbing] : no clubbing [No Edema] : no edema [No Focal Deficits] : no focal deficits [Oriented x3] : oriented x3 [Normal Affect] : normal affect [Benign] : benign [TextBox_68] : diminished aeration mild basilar crackles , trace, no wheeze

## 2023-08-31 NOTE — DISCUSSION/SUMMARY
[FreeTextEntry1] : 83 year old man who has dyspnea on exertion   He smoked only as a teen   He had COVID-19 infection in 2020 and spent a few days in the ER.  he had mild interstitial pattern on CXR at the time  He has clear lung exam and normal O2 saturation, 99%  he is not anemic  He reports some dysphagia and chest pain  EST and echo were normal 2021  he has no pedal edema  PFT was normal  and CXR 8/10/23 was normal as well  He states he is able to cut his lawn without dyspnea.  O2 saturation is 99%  he has mild basilar crackles on exam but current and prior CXR have been normal, except for mildly elevated right hemidiaphragm which may account for crackles at base  PLAN  return for PFT  Monitor status   Total time spent : 30 minutes Including: Preparation prior to visit - Reviewing prior record, results of tests and Consultation Reports as applicable Conducting an appropriate H & P during today's encounter Appropriate orders for tests, medications and procedures, as applicable Counseling patient  Note completion    Garfield Muro MD

## 2023-09-25 ENCOUNTER — APPOINTMENT (OUTPATIENT)
Dept: INTERNAL MEDICINE | Facility: CLINIC | Age: 83
End: 2023-09-25
Payer: MEDICARE

## 2023-09-25 VITALS
SYSTOLIC BLOOD PRESSURE: 116 MMHG | WEIGHT: 150 LBS | HEART RATE: 63 BPM | OXYGEN SATURATION: 98 % | RESPIRATION RATE: 15 BRPM | DIASTOLIC BLOOD PRESSURE: 86 MMHG | BODY MASS INDEX: 24.11 KG/M2 | HEIGHT: 66 IN | TEMPERATURE: 98 F

## 2023-09-25 DIAGNOSIS — Z23 ENCOUNTER FOR IMMUNIZATION: ICD-10-CM

## 2023-09-25 DIAGNOSIS — R74.8 ABNORMAL LEVELS OF OTHER SERUM ENZYMES: ICD-10-CM

## 2023-09-25 DIAGNOSIS — R41.3 OTHER AMNESIA: ICD-10-CM

## 2023-09-25 DIAGNOSIS — Z12.11 ENCOUNTER FOR SCREENING FOR MALIGNANT NEOPLASM OF COLON: ICD-10-CM

## 2023-09-25 DIAGNOSIS — F41.9 ANXIETY DISORDER, UNSPECIFIED: ICD-10-CM

## 2023-09-25 PROCEDURE — G0008: CPT

## 2023-09-25 PROCEDURE — G0439: CPT

## 2023-09-25 PROCEDURE — 90662 IIV NO PRSV INCREASED AG IM: CPT

## 2023-09-26 LAB
25(OH)D3 SERPL-MCNC: 31.9 NG/ML
ALBUMIN SERPL ELPH-MCNC: 4.6 G/DL
ALP BLD-CCNC: 61 U/L
ALT SERPL-CCNC: 18 U/L
ANION GAP SERPL CALC-SCNC: 12 MMOL/L
APPEARANCE: CLEAR
AST SERPL-CCNC: 23 U/L
BACTERIA UR CULT: NORMAL
BACTERIA: NEGATIVE /HPF
BASOPHILS # BLD AUTO: 0.03 K/UL
BASOPHILS NFR BLD AUTO: 0.6 %
BILIRUB SERPL-MCNC: 0.6 MG/DL
BILIRUBIN URINE: NEGATIVE
BLOOD URINE: NEGATIVE
BUN SERPL-MCNC: 25 MG/DL
CALCIUM SERPL-MCNC: 11.3 MG/DL
CAST: 0 /LPF
CHLORIDE SERPL-SCNC: 95 MMOL/L
CHOLEST SERPL-MCNC: 133 MG/DL
CO2 SERPL-SCNC: 28 MMOL/L
COLOR: YELLOW
CREAT SERPL-MCNC: 1.25 MG/DL
CREAT SPEC-SCNC: 43 MG/DL
EGFR: 56 ML/MIN/1.73M2
EOSINOPHIL # BLD AUTO: 0.07 K/UL
EOSINOPHIL NFR BLD AUTO: 1.5 %
EPITHELIAL CELLS: 1 /HPF
ESTIMATED AVERAGE GLUCOSE: 166 MG/DL
FERRITIN SERPL-MCNC: 68 NG/ML
FOLATE SERPL-MCNC: >20 NG/ML
GLUCOSE QUALITATIVE U: >=1000 MG/DL
GLUCOSE SERPL-MCNC: 106 MG/DL
HBA1C MFR BLD HPLC: 7.4 %
HCT VFR BLD CALC: 43.8 %
HDLC SERPL-MCNC: 70 MG/DL
HGB BLD-MCNC: 15.2 G/DL
IMM GRANULOCYTES NFR BLD AUTO: 0.4 %
IRON SATN MFR SERPL: 40 %
IRON SERPL-MCNC: 137 UG/DL
KETONES URINE: NEGATIVE MG/DL
LDLC SERPL CALC-MCNC: 50 MG/DL
LEUKOCYTE ESTERASE URINE: NEGATIVE
LYMPHOCYTES # BLD AUTO: 1.77 K/UL
LYMPHOCYTES NFR BLD AUTO: 37 %
MAN DIFF?: NORMAL
MCHC RBC-ENTMCNC: 33.9 PG
MCHC RBC-ENTMCNC: 34.7 GM/DL
MCV RBC AUTO: 97.6 FL
MICROALBUMIN 24H UR DL<=1MG/L-MCNC: <1.2 MG/DL
MICROALBUMIN/CREAT 24H UR-RTO: NORMAL MG/G
MICROSCOPIC-UA: NORMAL
MONOCYTES # BLD AUTO: 0.36 K/UL
MONOCYTES NFR BLD AUTO: 7.5 %
NEUTROPHILS # BLD AUTO: 2.54 K/UL
NEUTROPHILS NFR BLD AUTO: 53 %
NITRITE URINE: NEGATIVE
NONHDLC SERPL-MCNC: 64 MG/DL
PH URINE: 5.5
PLATELET # BLD AUTO: 167 K/UL
POTASSIUM SERPL-SCNC: 4 MMOL/L
PROT SERPL-MCNC: 7.4 G/DL
PROTEIN URINE: NEGATIVE MG/DL
RBC # BLD: 4.49 M/UL
RBC # FLD: 13.7 %
RED BLOOD CELLS URINE: 0 /HPF
SODIUM SERPL-SCNC: 135 MMOL/L
SPECIFIC GRAVITY URINE: 1.02
TIBC SERPL-MCNC: 338 UG/DL
TRIGL SERPL-MCNC: 67 MG/DL
TSH SERPL-ACNC: 1.96 UIU/ML
UIBC SERPL-MCNC: 201 UG/DL
UROBILINOGEN URINE: 0.2 MG/DL
VIT B12 SERPL-MCNC: 665 PG/ML
WBC # FLD AUTO: 4.79 K/UL
WHITE BLOOD CELLS URINE: 0 /HPF

## 2023-10-09 RX ORDER — FERROUS SULFATE 325(65) MG
325 (65 FE) TABLET ORAL DAILY
Qty: 90 | Refills: 0 | Status: ACTIVE | COMMUNITY
Start: 2023-06-14 | End: 1900-01-01

## 2023-10-10 ENCOUNTER — APPOINTMENT (OUTPATIENT)
Dept: CARDIOLOGY | Facility: CLINIC | Age: 83
End: 2023-10-10
Payer: MEDICARE

## 2023-10-10 ENCOUNTER — NON-APPOINTMENT (OUTPATIENT)
Age: 83
End: 2023-10-10

## 2023-10-10 VITALS
SYSTOLIC BLOOD PRESSURE: 112 MMHG | HEIGHT: 66 IN | BODY MASS INDEX: 23.95 KG/M2 | WEIGHT: 149 LBS | DIASTOLIC BLOOD PRESSURE: 70 MMHG | OXYGEN SATURATION: 98 % | HEART RATE: 69 BPM

## 2023-10-10 PROCEDURE — 99214 OFFICE O/P EST MOD 30 MIN: CPT

## 2023-10-10 PROCEDURE — 93000 ELECTROCARDIOGRAM COMPLETE: CPT

## 2023-10-12 ENCOUNTER — APPOINTMENT (OUTPATIENT)
Dept: PULMONOLOGY | Facility: CLINIC | Age: 83
End: 2023-10-12

## 2023-10-12 ENCOUNTER — APPOINTMENT (OUTPATIENT)
Dept: PULMONOLOGY | Facility: CLINIC | Age: 83
End: 2023-10-12
Payer: MEDICARE

## 2023-10-12 VITALS
SYSTOLIC BLOOD PRESSURE: 153 MMHG | OXYGEN SATURATION: 98 % | BODY MASS INDEX: 23.95 KG/M2 | HEART RATE: 91 BPM | HEIGHT: 66 IN | WEIGHT: 149 LBS | DIASTOLIC BLOOD PRESSURE: 84 MMHG

## 2023-10-12 DIAGNOSIS — J84.9 INTERSTITIAL PULMONARY DISEASE, UNSPECIFIED: ICD-10-CM

## 2023-10-12 DIAGNOSIS — J98.4 OTHER DISORDERS OF LUNG: ICD-10-CM

## 2023-10-12 PROCEDURE — 94729 DIFFUSING CAPACITY: CPT

## 2023-10-12 PROCEDURE — 94727 GAS DIL/WSHOT DETER LNG VOL: CPT

## 2023-10-12 PROCEDURE — 99214 OFFICE O/P EST MOD 30 MIN: CPT | Mod: 25

## 2023-10-12 PROCEDURE — 94010 BREATHING CAPACITY TEST: CPT

## 2023-10-19 ENCOUNTER — APPOINTMENT (OUTPATIENT)
Dept: GASTROENTEROLOGY | Facility: CLINIC | Age: 83
End: 2023-10-19

## 2023-10-20 ENCOUNTER — APPOINTMENT (OUTPATIENT)
Dept: CT IMAGING | Facility: CLINIC | Age: 83
End: 2023-10-20

## 2023-11-20 RX ORDER — LANCETS 33 GAUGE
EACH MISCELLANEOUS
Qty: 1 | Refills: 2 | Status: ACTIVE | COMMUNITY
Start: 2023-11-20 | End: 1900-01-01

## 2023-11-20 RX ORDER — BLOOD-GLUCOSE METER
KIT MISCELLANEOUS
Qty: 2 | Refills: 2 | Status: ACTIVE | COMMUNITY
Start: 2023-11-20 | End: 1900-01-01

## 2023-11-20 RX ORDER — BLOOD-GLUCOSE METER
W/DEVICE EACH MISCELLANEOUS
Qty: 1 | Refills: 0 | Status: ACTIVE | COMMUNITY
Start: 2023-11-20 | End: 1900-01-01

## 2023-12-07 ENCOUNTER — APPOINTMENT (OUTPATIENT)
Dept: GASTROENTEROLOGY | Facility: CLINIC | Age: 83
End: 2023-12-07

## 2023-12-14 ENCOUNTER — APPOINTMENT (OUTPATIENT)
Dept: PULMONOLOGY | Facility: CLINIC | Age: 83
End: 2023-12-14

## 2023-12-14 NOTE — REVIEW OF SYSTEMS
[Dyspnea on Exertion] : dyspnea on exertion [Dizziness] : dizziness [Memory Loss] : memory loss [Negative] : Heme/Lymph [Shortness Of Breath] : no shortness of breath [Wheezing] : no wheezing [Cough] : no cough [Headache] : no headache [Fainting] : no fainting [Confusion] : no confusion [Unsteady Walk] : no ataxia

## 2023-12-14 NOTE — PHYSICAL EXAM
[Well-Appearing] : well-appearing [Normal Sclera/Conjunctiva] : normal sclera/conjunctiva [PERRL] : pupils equal round and reactive to light [EOMI] : extraocular movements intact [Normal Outer Ear/Nose] : the outer ears and nose were normal in appearance [No JVD] : no jugular venous distention [Normal TMs] : both tympanic membranes were normal [No Lymphadenopathy] : no lymphadenopathy [Supple] : supple [Thyroid Normal, No Nodules] : the thyroid was normal and there were no nodules present [No Respiratory Distress] : no respiratory distress  [No Accessory Muscle Use] : no accessory muscle use [Clear to Auscultation] : lungs were clear to auscultation bilaterally [Normal Rate] : normal rate  [Regular Rhythm] : with a regular rhythm [No Murmur] : no murmur heard [No Carotid Bruits] : no carotid bruits [No Abdominal Bruit] : a ~M bruit was not heard ~T in the abdomen [No Varicosities] : no varicosities [Pedal Pulses Present] : the pedal pulses are present [No Palpable Aorta] : no palpable aorta [No Extremity Clubbing/Cyanosis] : no extremity clubbing/cyanosis [Non Tender] : non-tender [Non-distended] : non-distended [No HSM] : no HSM [Normal Bowel Sounds] : normal bowel sounds [Normal Posterior Cervical Nodes] : no posterior cervical lymphadenopathy [Normal Anterior Cervical Nodes] : no anterior cervical lymphadenopathy [No CVA Tenderness] : no CVA  tenderness [No Spinal Tenderness] : no spinal tenderness [No Joint Swelling] : no joint swelling [Grossly Normal Strength/Tone] : grossly normal strength/tone [Coordination Grossly Intact] : coordination grossly intact [No Rash] : no rash [Normal Gait] : normal gait [Deep Tendon Reflexes (DTR)] : deep tendon reflexes were 2+ and symmetric [Normal Insight/Judgement] : insight and judgment were intact [No Acute Distress] : no acute distress [Well Nourished] : well nourished [Normal Oropharynx] : normal oropharynx [Well Developed] : well developed [I] : Mallampati Class: I [Normal Appearance] : normal appearance [No Neck Mass] : no neck mass [Normal Rate/Rhythm] : normal rate/rhythm [Normal S1, S2] : normal s1, s2 [No Murmurs] : no murmurs [No Resp Distress] : no resp distress [Clear to Auscultation Bilaterally] : clear to auscultation bilaterally [Benign] : benign [Not Tender] : not tender [No Masses] : no masses [Soft] : soft [No Clubbing] : no clubbing [No Focal Deficits] : no focal deficits [No Edema] : no edema [Oriented x3] : oriented x3 [Normal Affect] : normal affect [de-identified] : AA Male [TextBox_68] : diminished aeration mild basilar crackles , trace, no wheeze

## 2023-12-14 NOTE — PROCEDURE
[FreeTextEntry1] : PFT  demonstrates restriction with diminished total lung capacity, mild, 72% predicted FEV1 and FVC are diminished with normal ratio.  The DLCO is   Garfield Muro MD

## 2023-12-14 NOTE — HISTORY OF PRESENT ILLNESS
[FreeTextEntry1] : CPE [de-identified] : 84 y/o M with PMH Normocytic Anemia, Hx CVA 2019, Hx Hydrocephalus, Poor Memory, HTN, HLD, Former Smoker, T2DM, Hyperparathyroidism, CKD, BPH, Elevated PSA, Anxiety, presents for CPE.  -He presents with his wife today. He reports feeling well today, offers no complaints.  -Patient has a history of Normocytic anemia, B12, Folate, iron panel, ferritin all normal. No clear cause, denies signs of melena, anemia, easy bleeding or bruising. His FIT Kit was negative 04/2021. The patient was referred to Hematology, seen by Dr. Portillo 12/2021. Anemia was deemed mild. His last Colon cancer was about 7-8 years ago. He has a FH Colon Cancer in his brother. Given that he was asymptomatic, Hgb stable, and stable weight, he was advised by Hematology that there would not be an indication for repeat colonoscopy. -His lab work 06/2023 showed low ferritin, the patient was started on oral iron, mailed FIT KIT, -recommended GI evaluation to consider repeat Colonoscopy and possibly EGD/small capsule study to find source of iron deficiency anemia. Patient has yet to see GI, he is scheduled to see GI Dr. Cabrera 08/04/2023 but did not attend the appointment. He is now scheduled to see GI Dr. Harrison 10/19/23 and will keep that appointment. He was also advised to return to his Hematology Dr. Portillo, since that time has not returned. -He states he is taking iron. ROS negative.  -Patient was recently seen for follow up by Pulmonary Dr. Muro for shortness of breath 08/2023. He was advised to return for PFTs. He is scheduled for follow up 10/2023.  -Patient had CVA 2019, no residual weakness/deficits. He follows with Neurology Dr. Jeter in [place]. During hospitalization in 2019 for CVA, CT Head showed hydrocephalus. He is forgetful, unchanged. Denies HA, visual changes, gait abnormalities, numbness, tingling, weakness. He is compliant with ASA 81 mg daily and Atorvastatin 10 mg daily. Patient's Neurologist prescribed for history of Anxiety, treated with Amitriptyline 25 mg daily, denies suicidal ideations or intentions. He is also taking Donepezil for memory loss.  -Sees by Cardiologist Dr. Henry, hx HTN, last visit 04/2023. He had a repeat nuclear stress test which was negative. ECG: June 1, 2021 sinus rhythm first-degree AV block nonspecific ST-T change Stress Test: July 2021 normal Echo: June 2021 normal LV systolic function LVH, mild diastolic dysfunction -Patient is compliant with their medications. He has chronic SIMONS, unchanged - patient was advised to follow up with Pulmonary. They maintain low salt/low fat diet. He is not very active. Denies any chest pain, palpitations, orthopnea, SOB at rest, lower extremity edema, near syncope, syncope, stroke like symptoms.  -Under the care of Pulmonary Dr. Acharya, Former Smoker, Chronic SIMONS which he states is unchanged. He was last seen 01/2022 by Pulmonary, patient will follow up as advised.  -Under the care of Endocrinology Dr. Perry, last seen 09/2022. Patient has T2DM, last A1c 7.4% 06/2023. Taking Jardiance 25 mg daily and Metformin 1000 mg BID, compliant with medications. Patient denies visual changes, thirst, unintentional weight changes, numbness, tingling, polyuria, polydipsia. Patient monitors their diet and checks fingersticks regularly. Denies episodes of Hypoglycemia. Needs new glucose monitoring kit and supplies.  -He also has Hyperparathyroidism with mild Hypercalcemia, his 24 hour urine calcium 11/2021 was not elevated. He was asymptomatic, not interested in surgery, and not a candidate for medical therapy. He was advised to avoid calcium supplements in vitamins/tums, and to increase oral hydration. His endocrinologist recommended follow up again in 3 months. He was referred to Nephrology given CKD3 in an uncontrolled Diabetic. -Sees Ophthalmology annually, last seen 06/2022, no diabetic retinopathy. -Sees Podiatry annually -Under the care of Urologist Dr. Petty. He has BPH and Elevated PSA. He takes finasteride and Tamsulosin daily. Per urology no longer needs PSA  SH: Former smoker (smoked 18-46 years at 1/4 PPD), no ETOH use, no drug use. Retired. Born in [place] , 4 children.   HCM: -Last Colonoscopy about 7-8 years ago and reports was normal. Patient to obtain records for review. Declines further colon/cancer screening -UTD PNA 13 06/2021  [TextBox_4] : 83 year old patient presents for evaluation of shortness of breath \par  Denies chest pain/pressure, cough wheeze, sputum production, sore throat\par  \par  He has had symptom for several months\par  \par  It is not constant\par  \par  He is comfortable at rest\par  he had a normal EST in 2021\par  \par  \par  He had an episode of sinusitis about a month ago.  He still has rhinitis\par  \par  \par  \par  Primary doctor is DR Otero\par  \par  \par  PSH:\par  \par  hernia\par  \par  cataract\par  \par  \par  PMH:\par  \par  CKD\par  anemia\par  DM\par  HTN\par  \par  HLD\par  \par  CVA\par  \par  BPH\par  \par  \par  SH:\par  \par  former smoker  as teen \par  \par  \par  ETOH:  rare\par  \par  \par  Occupation:  \par  \par  No exposure to chemicals, dust, asbestos, mold\par  \par  \par  \par  ALLERGY:\par  \par  NKDA\par  \par  environmental/seasonal allergy:  none\par  \par  \par  \par  Review of Systems:\par  \par  \par  no dysphagia\par  no dry mouth\par  \par  \par  no pneumonia\par  \par  no lung cancer\par  \par  no CAD\par  no MI\par  no chest pain\par  no murmur\par  no CHF\par  no edema\par  \par  no peptic ulcer or gastritis\par  no GERD\par  no abdominal pain\par  no liver disease\par  \par  no thyroid disease\par  \par  no bleeding\par  \par  no DVT or PE\par  \par  \par  no seizure\par  \par   history of COVID-19 infection \par  \par  \par  \par  \par  \par  \par  \par  \par  \par    [YearQuit] : 1970 [TextBox_22] : smoked only as teen [Snoring] : no snoring [Unintentional Sleep while Active] : no unintentional sleep while active [Witnessed Apneas] : no witnessed apneas

## 2023-12-14 NOTE — HEALTH RISK ASSESSMENT
[Good] : ~his/her~  mood as  good [No falls in past year] : Patient reported no falls in the past year [PHQ-2 Negative - No further assessment needed] : PHQ-2 Negative - No further assessment needed [0] : 2) Feeling down, depressed, or hopeless: Not at all (0) [Fully functional (bathing, dressing, toileting, transferring, walking, feeding)] : Fully functional (bathing, dressing, toileting, transferring, walking, feeding) [Fully functional (using the telephone, shopping, preparing meals, housekeeping, doing laundry, using] : Fully functional and needs no help or supervision to perform IADLs (using the telephone, shopping, preparing meals, housekeeping, doing laundry, using transportation, managing medications and managing finances) [Patient/Caregiver not ready to engage] : , patient/caregiver not ready to engage [Former] : Former [5-9] : 5-9 [> 15 Years] : > 15 Years [de-identified] :  Former smoker (smoked 18-46 years at 1/4 PPD),

## 2023-12-21 ENCOUNTER — APPOINTMENT (OUTPATIENT)
Dept: UROLOGY | Facility: CLINIC | Age: 83
End: 2023-12-21
Payer: MEDICARE

## 2023-12-21 PROCEDURE — 99214 OFFICE O/P EST MOD 30 MIN: CPT

## 2023-12-22 LAB
ANION GAP SERPL CALC-SCNC: 12 MMOL/L
BUN SERPL-MCNC: 15 MG/DL
CALCIUM SERPL-MCNC: 11 MG/DL
CHLORIDE SERPL-SCNC: 98 MMOL/L
CO2 SERPL-SCNC: 30 MMOL/L
CREAT SERPL-MCNC: 1.26 MG/DL
EGFR: 56 ML/MIN/1.73M2
GLUCOSE SERPL-MCNC: 148 MG/DL
POTASSIUM SERPL-SCNC: 4.4 MMOL/L
PSA FREE FLD-MCNC: 47 %
PSA FREE SERPL-MCNC: 1.04 NG/ML
PSA SERPL-MCNC: 2.2 NG/ML
SODIUM SERPL-SCNC: 140 MMOL/L

## 2023-12-24 NOTE — ADDENDUM
[FreeTextEntry1] : Entered by Delmy Topete, acting as scribe for Dr. Ty Reese. The documentation recorded by the scribe accurately reflects the service I personally performed and the decisions made by me.

## 2023-12-24 NOTE — HISTORY OF PRESENT ILLNESS
[FreeTextEntry1] : New fail BPH.  Patient on Proscar.  Has progressive symptoms.  Add Flomax. Patient is working large prostate over 100 g rectal examination. History of elevated PSA.  Reviewed PSA.  Follow-up 6 months.  Please refer to URO Consult note

## 2023-12-24 NOTE — LETTER BODY
[FreeTextEntry1] : Lily Branden, DO [address] [facility] [place] [address] [phone]   Dear Branden,   REASON FOR VISIT: BPH. Elevated PSA.   This is a 83 year-old gentleman with lower urinary tract symptoms, BPH and elevated PSA. Patient is here today for evaluation. Patient reports he has weak uroflow, frequency, and hesitancy despite medical therapy. He denies any hematuria or urinary incontinence. His symptoms are aggravated by hydration. He denies any alleviating factors. He is currently taking Proscar without improvement. He has progressive symptoms. He denies any pain. All other review of systems are negative. He has no cancer in his family medical history. He has no previous surgical history. Past medical history, family history and social history were inquired and were noncontributory to current condition. The patient does not use tobacco or drink alcohol. Medications and allergies were reviewed. He has no known allergies to medication.  On examination, the patient is a healthy-appearing gentleman in no acute distress. He is alert and oriented follows commands. He has normal mood and affect. He is normocephalic. Neck is supple. Oral no thrush. Respirations are unlabored. His abdomen is soft and nontender. Bladder is nonpalpable. No CVA tenderness. Neurologically he is grossly intact. No peripheral edema. Skin without gross abnormality. He has normal male external genitalia. Normal meatus. Bilateral testes are descended intrascrotally and normal to palpation. On rectal examination, there is normal sphincter tone. The prostate is clinically benign without focal induration or nodularity.  ASSESSMENT: BPH  I counseled the patient on the various etiology of his symptoms. I discussed the natural history of BPH and the treatment options available. I discussed the options of conservative management with fluid in dietary restrictions, herbal therapy, medical therapy, and minimally invasive procedures. Risk and benefits were discussed. I answered his questions. I recommended he add Flomax to the regimen. I discussed the potential side effects of the medication. I counseled the patient on its use and side effects. If the patient develops any side effects, the patient will discontinue the medication and contact me. I renewed the patient's prescription for Proscar and Flomax today. I encouraged the patient to continue medications regularly as directed. Patient is working large prostate over 100 g rectal examination. In terms of his elevated PSA, he will repeat PSA and BMP to establish baseline. Risks and alternatives were discussed. I answered the patient questions. The patient will follow-up as directed and will contact me with any questions or concerns. Thank you for the opportunity to participate in the care of Mr. CORONA. I will keep you updated on  his progress.  Plan: Add Flomax. Continue Proscar. PSA. BMP. Follow up in 6 months.

## 2024-01-11 ENCOUNTER — APPOINTMENT (OUTPATIENT)
Dept: INTERNAL MEDICINE | Facility: CLINIC | Age: 84
End: 2024-01-11
Payer: MEDICARE

## 2024-01-11 VITALS
OXYGEN SATURATION: 99 % | RESPIRATION RATE: 17 BRPM | HEIGHT: 66 IN | BODY MASS INDEX: 24.75 KG/M2 | HEART RATE: 98 BPM | SYSTOLIC BLOOD PRESSURE: 144 MMHG | TEMPERATURE: 97.8 F | DIASTOLIC BLOOD PRESSURE: 90 MMHG | WEIGHT: 154 LBS

## 2024-01-11 DIAGNOSIS — N18.9 CHRONIC KIDNEY DISEASE, UNSPECIFIED: ICD-10-CM

## 2024-01-11 DIAGNOSIS — E11.9 TYPE 2 DIABETES MELLITUS W/OUT COMPLICATIONS: ICD-10-CM

## 2024-01-11 DIAGNOSIS — I63.9 CEREBRAL INFARCTION, UNSPECIFIED: ICD-10-CM

## 2024-01-11 DIAGNOSIS — D50.9 IRON DEFICIENCY ANEMIA, UNSPECIFIED: ICD-10-CM

## 2024-01-11 PROCEDURE — 99214 OFFICE O/P EST MOD 30 MIN: CPT

## 2024-01-11 NOTE — ASSESSMENT
[FreeTextEntry1] : 82 y/o M with PMH Normocytic Anemia, Hx CVA 2019, Hx Hydrocephalus, Poor Memory, HTN, HLD, Former Smoker, T2DM, Hyperparathyroidism, CKD3, BPH, Elevated PSA, Anxiety, presents for follow up.   1. HCM:  -UTD CPE -Last Colonoscopy about 7-8 years ago and reports was normal. Patient to obtain records for review. Declines further colon/cancer screening  -UTD Prevnar 13 06/2021.  -Dermatology annual skin cancer screening    2. Normocytic Anemia:  -Patient has a history of Normocytic anemia, B12, Folate, iron panel, ferritin all normal. No clear cause, denies signs of melena, anemia, easy bleeding or bruising. His FIT Kit was negative 04/2021. Repeat CBC 9/2023 normal. -Under the care of Dr. Portillo, last visit 12/2021. Anemia was deemed mild. His last Colon cancer was about 7-8 years ago. He has a FH Colon Cancer in his brother. Given that he was asymptomatic, Hgb stable, and stable weight, he was advised by Hematology that there would not be an indication for repeat colonoscopy.  -His lab work 06/2023 showed low ferritin, the patient was started on oral iron, mailed FIT KIT, referred to GI and advised to return to his Hematology Dr. Portillo, since that time has not seen GI or Heme. Has pending GI visit 10/2023.  -Repeat lab work 9/2023 normal CBC and iron studies -Will repeat labs today  -RTO within 3 months or sooner if needed with another [facility] provider as I am going on Maternity leave 2/2024. Patient is aware.   3. Hx CVA 2019, Hx Hydrocephalus, Forgetfulness, Anxiety:  -Compliant with his medications  -Able to perform ADLs, needs assistance with IADls, no recent falls/trauma.  -Patient had CVA 2019, no residual weakness/deficits. He follows with Neurology Dr. Jeter in [place]  -CT Head 2019 showed hydrocephalus. He is forgetful. Denies HA, visual changes, gait abnormalities, numbness, tingling, weakness. He is compliant with ASA 81 mg daily and Atorvastatin 10 mg daily. He is also taking Donepezil for memory loss.  -Patient's Neurologist prescribed for history of Anxiety, treated with Amitriptyline 25 mg daily, denies suicidal ideations or intentions.  -Continue medicine per Neurology, f/u as advised  -ER precautions discussed    4. HTN, HLD, Chronic SIMONS:  -HD stable on medications  -BP goals discussed  -Check labs  -Monitor BP regularly, low salt diet.  -Sees Cardiologist Dr. Henry, hx HTN, last visit 10/2023. He had a repeat nuclear stress test which was negative.  -Stress Test: July 2021 normal  Echo: June 2021 normal LV systolic function LVH, mild diastolic dysfunction  -RTO 3 months or sooner if needed.   5. Former Smoker, Chronic SIMONS:  -Patient was receently seen for follow up by Pulmonary Dr. Muro for shortness of breath /2023. He was advised to go for CT chest, has yet to do so but will schedule.   6. T2DM  7. Hyperparathyroidism  8. CKD:  -Under the care of Endocrinology Dr. Perry, last seen 09/2022. Patient has T2DM, last A1c 7.4% 06/2023. Taking Jardiance 25 mg daily and Metformin 1000 mg BID, compliant with medications. Patient denies visual changes, thirst, unintentional weight changes, numbness, tingling, polyuria, polydipsia. Patient monitors their diet and checks fingersticks regularly. Denies episodes of Hypoglycemia.  -He also has Hyperparathyroidism with mild Hypercalcemia, his 24 hour urine calcium 11/2021 was not elevated. He was asymptomatic, not interested in surgery, and not a candidate for medical therapy. He was advised to avoid calcium supplements in vitamins/TUMS, and to increase oral hydration. His endocrinologist recommended follow up again in 3 months.  -He was referred to Nephrology given CKD3, pt to schedule -On ACE renal protection.  -On statin CV risk reduction  -Due to return to Ophthalmology and podiatry - no diabetic retinopathy prior -Reviewed the importance of monitoring blood sugar regularly.  Reviewed importance of monitoring Hemoglobin A1c level regularly.  Reviewed importance of monitoring urine microalbumin to check for kidney disease.  Reviewed importance of proper foot care, and regularly checking feet to prevent sores and avoiding loss of limbs.  Dietary and exercise modalities reviewed. Importance of monitoring weight was discussed.  Risks of DM including but not limited to developing HTN, Hyperlipidemia, Circulation problems, and their subsequent complications. Complications of these medical conditions include: MI/TIA/CVA/PAD/PVD/retinopathy/nephropathy/neuropathy/amputation and even early death in severe cases.    9. BPH, Elevated PSA:  -Under the care of Dr. Reese urology, last seen 12/2023. Per urology to repeat PSA again in 6 months, last checked 12/2023. -Patient offers no  complaints at this time.   All questions were answered today. Patient understands and is in agreement with the plan of care.  Patient to call and follow up lab results.  Return to office as advised or sooner if needed with any questions or concerns.

## 2024-01-11 NOTE — REVIEW OF SYSTEMS
[Dyspnea on Exertion] : dyspnea on exertion [Memory Loss] : memory loss [Negative] : Heme/Lymph [Shortness Of Breath] : no shortness of breath [Wheezing] : no wheezing [Cough] : no cough [Headache] : no headache [Dizziness] : no dizziness [Fainting] : no fainting [Confusion] : no confusion [Unsteady Walk] : no ataxia

## 2024-01-11 NOTE — PHYSICAL EXAM
[No Acute Distress] : no acute distress [Well Nourished] : well nourished [Well Developed] : well developed [Well-Appearing] : well-appearing [Normal Sclera/Conjunctiva] : normal sclera/conjunctiva [PERRL] : pupils equal round and reactive to light [EOMI] : extraocular movements intact [Normal Outer Ear/Nose] : the outer ears and nose were normal in appearance [Normal Oropharynx] : the oropharynx was normal [Normal TMs] : both tympanic membranes were normal [No JVD] : no jugular venous distention [No Lymphadenopathy] : no lymphadenopathy [Supple] : supple [Thyroid Normal, No Nodules] : the thyroid was normal and there were no nodules present [No Respiratory Distress] : no respiratory distress  [No Accessory Muscle Use] : no accessory muscle use [Clear to Auscultation] : lungs were clear to auscultation bilaterally [Normal Rate] : normal rate  [Regular Rhythm] : with a regular rhythm [Normal S1, S2] : normal S1 and S2 [No Murmur] : no murmur heard [No Carotid Bruits] : no carotid bruits [No Abdominal Bruit] : a ~M bruit was not heard ~T in the abdomen [No Varicosities] : no varicosities [Pedal Pulses Present] : the pedal pulses are present [No Edema] : there was no peripheral edema [No Palpable Aorta] : no palpable aorta [No Extremity Clubbing/Cyanosis] : no extremity clubbing/cyanosis [Soft] : abdomen soft [Non Tender] : non-tender [Non-distended] : non-distended [No Masses] : no abdominal mass palpated [No HSM] : no HSM [Normal Bowel Sounds] : normal bowel sounds [Normal Posterior Cervical Nodes] : no posterior cervical lymphadenopathy [Normal Anterior Cervical Nodes] : no anterior cervical lymphadenopathy [No CVA Tenderness] : no CVA  tenderness [No Spinal Tenderness] : no spinal tenderness [No Joint Swelling] : no joint swelling [Grossly Normal Strength/Tone] : grossly normal strength/tone [No Rash] : no rash [Coordination Grossly Intact] : coordination grossly intact [No Focal Deficits] : no focal deficits [Normal Gait] : normal gait [Deep Tendon Reflexes (DTR)] : deep tendon reflexes were 2+ and symmetric [Normal Affect] : the affect was normal [Normal Insight/Judgement] : insight and judgment were intact [de-identified] : AA Male

## 2024-01-11 NOTE — HISTORY OF PRESENT ILLNESS
[FreeTextEntry1] : Follow up [de-identified] : 84 y/o M with PMH Normocytic Anemia, Hx CVA 2019, Hx Hydrocephalus, Poor Memory, HTN, HLD, Former Smoker, T2DM, Hyperparathyroidism, CKD, BPH, Elevated PSA, Anxiety, presents for follow up.     -He presents alone today. He reports feeling well today, offers no complaints. His wife Meg is currently hospitalized due to a stroke on [place] day, he is currently living with his daughter and his son. He does not want me to call them at this time. Patient is worried about his wife, has support at home.   -Patient has a history of Normocytic anemia, B12, Folate, iron panel, ferritin all normal. No clear cause, denies signs of melena, anemia, easy bleeding or bruising. His FIT Kit was negative 04/2021. The patient was referred to Hematology, seen by Dr. Portillo 12/2021. Anemia was deemed mild. His last Colon cancer was about 7-8 years ago. He has a FH Colon Cancer in his brother. Given that he was asymptomatic, Hgb stable, and stable weight, he was advised by Hematology that there would not be an indication for repeat colonoscopy.   -His lab work 06/2023 showed low ferritin, the patient was started on oral iron, mailed FIT KIT, -recommended GI evaluation to consider repeat Colonoscopy and possibly EGD/small capsule study to find source of iron deficiency anemia. Patient has yet to see GI. He is now scheduled to see GI Dr. Bowie 1/24/2024 and will keep that appointment. He was also advised to return to his Hematology Dr. Portillo, since that time has not returned.  -He states he is taking iron. ROS negative.    -Patient was recently seen for follow up by Pulmonary Dr. Muro for shortness of breath 10/2023. He was advised to schedule CT Chest, has yet to do so.  -Patient had CVA 2019, no residual weakness/deficits. He follows with Neurology Dr. Jeter in [place]. During hospitalization in 2019 for CVA, CT Head showed hydrocephalus. He is forgetful, unchanged. Denies HA, visual changes, gait abnormalities, numbness, tingling, weakness. He is compliant with ASA 81 mg daily and Atorvastatin 10 mg daily. Patient's Neurologist prescribed for history of Anxiety, treated with Amitriptyline 25 mg daily, denies suicidal ideations or intentions. He is also taking Donepezil for memory loss.     -Sees by Cardiologist Dr. Henry, hx HTN, last visit 10/2023. He had a repeat nuclear stress test which was negative.  ECG: June 1, 2021 sinus rhythm first-degree AV block nonspecific ST-T change  Stress Test: July 2021 normal  Echo: June 2021 normal LV systolic function LVH, mild diastolic dysfunction  -Patient is compliant with their medications. He has chronic SIMONS, unchanged - patient was advised to follow up with Pulmonary. They maintain low salt/low fat diet. He is not very active. Denies any chest pain, palpitations, orthopnea, SOB at rest, lower extremity edema, near syncope, syncope, stroke like symptoms.    -Under the care of Endocrinology Dr. Perry, last seen 09/2022. Patient has T2DM, last A1c 7.4% 06/2023. Taking Jardiance 25 mg daily and Metformin 1000 mg BID, compliant with medications. Patient denies visual changes, thirst, unintentional weight changes, numbness, tingling, polyuria, polydipsia. Patient monitors their diet and checks fingersticks regularly. Denies episodes of Hypoglycemia. Needs new glucose monitoring kit and supplies.    -He also has Hyperparathyroidism with mild Hypercalcemia, his 24 hour urine calcium 11/2021 was not elevated. He was asymptomatic, not interested in surgery, and not a candidate for medical therapy. He was advised to avoid calcium supplements in vitamins/tums, and to increase oral hydration. His endocrinologist recommended follow up again in 3 months. He was referred to Nephrology given CKD3, has yet to go.  -Sees Ophthalmology annually, last seen 06/2022, no diabetic retinopathy.  -Sees Podiatry annually   -Under the care of Urologist Dr. Reese. He has BPH and Elevated PSA. He takes finasteride and Tamsulosin daily. Per urology to repeat PSA again in 6 months, last checked 12/2023.  SH: Former smoker (smoked 18-46 years at 1/4 [facility]), no ETOH use, no drug use. Retired. Born in [place]. , 4 children.     HCM:  -Last Colonoscopy about 7-8 years ago and reports was normal. Patient to obtain records for review. Declines further colon/cancer screening  -UTD PNA 13 06/2021

## 2024-01-15 ENCOUNTER — RX RENEWAL (OUTPATIENT)
Age: 84
End: 2024-01-15

## 2024-01-16 ENCOUNTER — APPOINTMENT (OUTPATIENT)
Dept: ENDOCRINOLOGY | Facility: CLINIC | Age: 84
End: 2024-01-16

## 2024-01-22 ENCOUNTER — APPOINTMENT (OUTPATIENT)
Dept: INTERNAL MEDICINE | Facility: CLINIC | Age: 84
End: 2024-01-22

## 2024-01-22 LAB
ALBUMIN SERPL ELPH-MCNC: 4.6 G/DL
ALP BLD-CCNC: 55 U/L
ALT SERPL-CCNC: 16 U/L
ANION GAP SERPL CALC-SCNC: 12 MMOL/L
APPEARANCE: CLEAR
AST SERPL-CCNC: 21 U/L
BACTERIA UR CULT: NORMAL
BACTERIA: NEGATIVE /HPF
BASOPHILS # BLD AUTO: 0.03 K/UL
BASOPHILS NFR BLD AUTO: 0.8 %
BILIRUB SERPL-MCNC: 0.6 MG/DL
BILIRUBIN URINE: NEGATIVE
BLOOD URINE: NEGATIVE
BUN SERPL-MCNC: 19 MG/DL
CALCIUM SERPL-MCNC: 11.2 MG/DL
CAST: 0 /LPF
CHLORIDE SERPL-SCNC: 98 MMOL/L
CHOLEST SERPL-MCNC: 141 MG/DL
CO2 SERPL-SCNC: 28 MMOL/L
COLOR: YELLOW
CREAT SERPL-MCNC: 1.14 MG/DL
EGFR: 63 ML/MIN/1.73M2
EOSINOPHIL # BLD AUTO: 0.05 K/UL
EOSINOPHIL NFR BLD AUTO: 1.4 %
EPITHELIAL CELLS: 0 /HPF
ESTIMATED AVERAGE GLUCOSE: 148 MG/DL
FERRITIN SERPL-MCNC: 83 NG/ML
FOLATE SERPL-MCNC: >20 NG/ML
GLUCOSE QUALITATIVE U: NEGATIVE MG/DL
GLUCOSE SERPL-MCNC: 171 MG/DL
HBA1C MFR BLD HPLC: 6.8 %
HCT VFR BLD CALC: 40.3 %
HDLC SERPL-MCNC: 80 MG/DL
HGB BLD-MCNC: 14.1 G/DL
IMM GRANULOCYTES NFR BLD AUTO: 0.3 %
IRON SATN MFR SERPL: 33 %
IRON SERPL-MCNC: 105 UG/DL
KETONES URINE: NEGATIVE MG/DL
LDLC SERPL CALC-MCNC: 52 MG/DL
LEUKOCYTE ESTERASE URINE: NEGATIVE
LYMPHOCYTES # BLD AUTO: 1.41 K/UL
LYMPHOCYTES NFR BLD AUTO: 38.2 %
MAN DIFF?: NORMAL
MCHC RBC-ENTMCNC: 34.6 PG
MCHC RBC-ENTMCNC: 35 GM/DL
MCV RBC AUTO: 99 FL
MICROSCOPIC-UA: NORMAL
MONOCYTES # BLD AUTO: 0.35 K/UL
MONOCYTES NFR BLD AUTO: 9.5 %
NEUTROPHILS # BLD AUTO: 1.84 K/UL
NEUTROPHILS NFR BLD AUTO: 49.8 %
NITRITE URINE: NEGATIVE
NONHDLC SERPL-MCNC: 61 MG/DL
PH URINE: 5.5
PLATELET # BLD AUTO: 191 K/UL
POTASSIUM SERPL-SCNC: 4.2 MMOL/L
PROT SERPL-MCNC: 7.2 G/DL
PROTEIN URINE: NEGATIVE MG/DL
RBC # BLD: 4.07 M/UL
RBC # FLD: 13.7 %
RED BLOOD CELLS URINE: 0 /HPF
SODIUM SERPL-SCNC: 138 MMOL/L
SPECIFIC GRAVITY URINE: 1.01
TIBC SERPL-MCNC: 320 UG/DL
TRIGL SERPL-MCNC: 39 MG/DL
UIBC SERPL-MCNC: 215 UG/DL
UROBILINOGEN URINE: 0.2 MG/DL
VIT B12 SERPL-MCNC: 456 PG/ML
WBC # FLD AUTO: 3.69 K/UL
WHITE BLOOD CELLS URINE: 0 /HPF

## 2024-01-24 ENCOUNTER — APPOINTMENT (OUTPATIENT)
Dept: GASTROENTEROLOGY | Facility: CLINIC | Age: 84
End: 2024-01-24
Payer: MEDICARE

## 2024-01-24 VITALS
HEART RATE: 89 BPM | DIASTOLIC BLOOD PRESSURE: 72 MMHG | WEIGHT: 149 LBS | SYSTOLIC BLOOD PRESSURE: 126 MMHG | BODY MASS INDEX: 23.95 KG/M2 | TEMPERATURE: 97.1 F | HEIGHT: 66 IN | OXYGEN SATURATION: 98 %

## 2024-01-24 DIAGNOSIS — R79.0 ABNORMAL LVL OF BLOOD MINERAL: ICD-10-CM

## 2024-01-24 PROCEDURE — 99204 OFFICE O/P NEW MOD 45 MIN: CPT

## 2024-01-24 NOTE — ASSESSMENT
[FreeTextEntry1] : Impression and plan Patient came to the office today for discussion and evaluation.  He had been noted to have low ferritin and borderline CBC at 1 point back in 2021.  More recently blood work has normalized.  The patient is now 83 soon-to-be 84 years old.  He is asymptomatic with regard to the GI tract.  He denies rectal bleeding or melena.  He is taking aspirin on a daily basis which would make occult blood testing unreliable.  I suggested that perhaps we could pursue noninvasive testing i.e. virtual colonoscopy as the first step in the workup given his family history of colon cancer.  But beyond this I would not pursue endoscopic testing given his advanced age and other medical issues.  Based upon virtual colonoscopy we can decide what if any further testing might be required but sequential CBC and iron studies should suffice.  Patient was encouraged to report any dark stools or signs of bleeding.  He will call back after virtual colonoscopy is complete.  The patient is reluctant to proceed at this point as his wife is recovering from a stroke at [facility] but will get back to me when testing is complete.

## 2024-01-24 NOTE — HISTORY OF PRESENT ILLNESS
[FreeTextEntry1] : New patient presents to the office today for evaluation and discussion.  Patient is an 83-year-old male who had been noted to have borderline anemia and iron deficiency during the summer.  He came today to discuss what if any investigation might be required.  Patient has an extensive past medical history as noted in patient's recent internal medicine evaluation.  Patient has a history of hypertension BPH previous CVA diabetes and hypercalcemia.  He suffers from poor memory and questionable hydrocephalus.  He states that his brother had colon cancer and believes that his last colonoscopy was performed more than 5 years ago.  The patient denies current nausea vomiting fever chills rectal bleeding or melena.  He does not notice dark stools or change in bowel habits.  Most recent blood work performed a few days ago demonstrates normal CBC with normal iron studies.

## 2024-02-11 ENCOUNTER — RX RENEWAL (OUTPATIENT)
Age: 84
End: 2024-02-11

## 2024-02-20 ENCOUNTER — APPOINTMENT (OUTPATIENT)
Dept: CT IMAGING | Facility: CLINIC | Age: 84
End: 2024-02-20

## 2024-04-09 RX ORDER — LISINOPRIL AND HYDROCHLOROTHIAZIDE TABLETS 20; 25 MG/1; MG/1
20-25 TABLET ORAL
Qty: 90 | Refills: 0 | Status: ACTIVE | COMMUNITY
Start: 2021-03-25 | End: 1900-01-01

## 2024-04-09 RX ORDER — ATORVASTATIN CALCIUM 10 MG/1
10 TABLET, FILM COATED ORAL
Qty: 90 | Refills: 0 | Status: ACTIVE | COMMUNITY
Start: 2021-02-22 | End: 1900-01-01

## 2024-04-16 RX ORDER — CHLORHEXIDINE GLUCONATE 4 %
325 (65 FE) LIQUID (ML) TOPICAL
Qty: 90 | Refills: 0 | Status: ACTIVE | COMMUNITY
Start: 2024-01-16 | End: 1900-01-01

## 2024-05-14 ENCOUNTER — NON-APPOINTMENT (OUTPATIENT)
Age: 84
End: 2024-05-14

## 2024-05-14 ENCOUNTER — APPOINTMENT (OUTPATIENT)
Dept: CARDIOLOGY | Facility: CLINIC | Age: 84
End: 2024-05-14
Payer: MEDICARE

## 2024-05-14 VITALS
WEIGHT: 146 LBS | HEART RATE: 85 BPM | HEIGHT: 66 IN | OXYGEN SATURATION: 99 % | BODY MASS INDEX: 23.46 KG/M2 | SYSTOLIC BLOOD PRESSURE: 122 MMHG | DIASTOLIC BLOOD PRESSURE: 82 MMHG

## 2024-05-14 DIAGNOSIS — R06.09 OTHER FORMS OF DYSPNEA: ICD-10-CM

## 2024-05-14 DIAGNOSIS — I10 ESSENTIAL (PRIMARY) HYPERTENSION: ICD-10-CM

## 2024-05-14 DIAGNOSIS — Z87.891 PERSONAL HISTORY OF NICOTINE DEPENDENCE: ICD-10-CM

## 2024-05-14 PROCEDURE — 93000 ELECTROCARDIOGRAM COMPLETE: CPT

## 2024-05-14 PROCEDURE — G2211 COMPLEX E/M VISIT ADD ON: CPT

## 2024-05-14 PROCEDURE — 99214 OFFICE O/P EST MOD 30 MIN: CPT

## 2024-05-14 RX ORDER — METOPROLOL TARTRATE 50 MG/1
50 TABLET, FILM COATED ORAL
Qty: 180 | Refills: 0 | Status: DISCONTINUED | COMMUNITY
Start: 2021-04-13 | End: 2024-05-14

## 2024-05-14 RX ORDER — METOPROLOL SUCCINATE 25 MG/1
25 TABLET, EXTENDED RELEASE ORAL
Qty: 30 | Refills: 3 | Status: ACTIVE | COMMUNITY
Start: 2024-05-14 | End: 1900-01-01

## 2024-05-14 NOTE — HISTORY OF PRESENT ILLNESS
[FreeTextEntry1] : Patient seen for prior history of stroke and hypertension.  He has no angina history of congestive heart failure chest pain palpitations.    He has chronic dyspnea which is unchanged.  He indicates he feels tired after taking his morning medications.  Stress related to wife's recent illness

## 2024-05-14 NOTE — ASSESSMENT
[FreeTextEntry1] : 83-year-old man with prior stroke history of hypertension and LVH, diabetes.   Suspect his a.m. symptoms related to medication

## 2024-05-14 NOTE — DISCUSSION/SUMMARY
[Patient] : the patient [___ Month(s)] : in [unfilled] month(s) [FreeTextEntry1] : Will reduce metoprolol dosing and give long-acting at night see if it helps his a.m. fatigue.  Medical follow-up with Dr Otero [EKG obtained to assist in diagnosis and management of assessed problem(s)] : EKG obtained to assist in diagnosis and management of assessed problem(s)

## 2024-05-14 NOTE — PHYSICAL EXAM
[Normal S1, S2] : normal S1, S2 [Murmur] : murmur [Normal] : soft, non-tender, no masses/organomegaly, normal bowel sounds [No Edema] : no edema [Moves all extremities] : moves all extremities [de-identified] : apical systolic murmur

## 2024-05-14 NOTE — CARDIOLOGY SUMMARY
[de-identified] : April 25, 2023 sinus rhythm fragmented QRS in the inferior leads October 10, 2023 sinus rhythm LAFB May 2024 sinus LAFB [de-identified] : 2021 mild LVH normal LV systolic function  2023 normal lvef, septal hypertrophy [de-identified] : 2023 normal pharm. nuclear stress

## 2024-05-14 NOTE — REVIEW OF SYSTEMS
[Feeling Fatigued] : not feeling fatigued [Weight Loss (___ Lbs)] : no recent weight loss [SOB] : shortness of breath [Dyspnea on exertion] : dyspnea during exertion [Chest Discomfort] : no chest discomfort [Lower Ext Edema] : no extremity edema

## 2024-06-19 DIAGNOSIS — N13.8 BENIGN PROSTATIC HYPERPLASIA WITH LOWER URINARY TRACT SYMPMS: ICD-10-CM

## 2024-06-19 DIAGNOSIS — N40.1 BENIGN PROSTATIC HYPERPLASIA WITH LOWER URINARY TRACT SYMPMS: ICD-10-CM

## 2024-06-19 DIAGNOSIS — R97.20 ELEVATED PROSTATE, SPECIFIC ANTIGEN [PSA]: ICD-10-CM

## 2024-06-20 ENCOUNTER — APPOINTMENT (OUTPATIENT)
Dept: UROLOGY | Facility: CLINIC | Age: 84
End: 2024-06-20
Payer: MEDICARE

## 2024-06-20 LAB
ANION GAP SERPL CALC-SCNC: 12 MMOL/L
BUN SERPL-MCNC: 22 MG/DL
CALCIUM SERPL-MCNC: 11.5 MG/DL
CHLORIDE SERPL-SCNC: 100 MMOL/L
CO2 SERPL-SCNC: 27 MMOL/L
CREAT SERPL-MCNC: 1.33 MG/DL
EGFR: 52 ML/MIN/1.73M2
GLUCOSE SERPL-MCNC: 149 MG/DL
POTASSIUM SERPL-SCNC: 4.1 MMOL/L
PSA FREE FLD-MCNC: 30 %
PSA FREE SERPL-MCNC: 0.45 NG/ML
PSA SERPL-MCNC: 1.52 NG/ML
SODIUM SERPL-SCNC: 139 MMOL/L

## 2024-06-20 PROCEDURE — 51798 US URINE CAPACITY MEASURE: CPT

## 2024-06-20 PROCEDURE — 99214 OFFICE O/P EST MOD 30 MIN: CPT

## 2024-06-20 PROCEDURE — G2211 COMPLEX E/M VISIT ADD ON: CPT

## 2024-06-20 RX ORDER — TAMSULOSIN HYDROCHLORIDE 0.4 MG/1
0.4 CAPSULE ORAL
Qty: 90 | Refills: 3 | Status: ACTIVE | COMMUNITY
Start: 2023-12-21 | End: 1900-01-01

## 2024-06-20 RX ORDER — FINASTERIDE 5 MG/1
5 TABLET, FILM COATED ORAL DAILY
Qty: 90 | Refills: 3 | Status: ACTIVE | COMMUNITY
Start: 2023-12-21 | End: 1900-01-01

## 2024-06-20 NOTE — ADDENDUM
[FreeTextEntry1] : Entered by Jorje Dudley, acting as scribe for Dr. Ty Reese. The documentation recorded by the scribe accurately reflects the service I personally performed and the decisions made by me.

## 2024-06-20 NOTE — HISTORY OF PRESENT ILLNESS
[FreeTextEntry1] :  follow-up BPH.  Flomax Proscar.    Follow-up elevated PSA.  Repeat PSA.  Follow-up 1 year.    PVR 23 cc.  Please refer to URO Consult Note.

## 2024-06-20 NOTE — LETTER BODY
[FreeTextEntry1] : Lily Branden, DO [address] [place] [address] [phone]   Dear Branden,   REASON FOR VISIT: BPH. Elevated PSA.   This is a 84 year-old gentleman with lower urinary tract symptoms, BPH and elevated PSA. Patient is here today for follow up. Since he was last seen, he reports that he continues to take Flomax and Proscar.  Patient reports taking the medications regularly without any side effects or difficulties with the medication. He notes stable symptoms on medical therapy. He denies any pain. All other review of systems are negative. He has no cancer in his family medical history. He has no previous surgical history. Past medical history, family history and social history were inquired and were noncontributory to current condition. The patient does not use tobacco or drink alcohol. Medications and allergies were reviewed. He has no known allergies to medication.  On examination, the patient is a healthy-appearing gentleman in no acute distress. He is alert and oriented follows commands. He has normal mood and affect. He is normocephalic. Neck is supple. Oral no thrush. Respirations are unlabored. His abdomen is soft and nontender. Bladder is nonpalpable. No CVA tenderness. Neurologically he is grossly intact. No peripheral edema. Skin without gross abnormality.  His PSA in December 2023 was 2.20, WNL   Post-void residual on bladder scan today was 23 cc.   ASSESSMENT: BPH. Elevated PSA.  I counseled the patient. In terms of his BPH, the patient reported stable symptoms on medical therapy. His PVR today was 23 cc. I recommended the patient continue Flomax and Proscar.  I renewed the patient's prescription for Flomax and Proscar today. I encouraged the patient to continue medications regularly as directed. In terms of his elevated PSA, his PSA was normal. He will repeat PSA and BMP to establish baseline. Risks and alternatives were discussed. I answered the patient questions. The patient will follow-up as directed and will contact me with any questions or concerns. Thank you for the opportunity to participate in the care of Mr. CORONA. I will keep you updated on his progress.  Plan: Continue Proscar and Flomax. PVR. PSA. BMP. Follow up in 1 year.

## 2024-06-30 ENCOUNTER — NON-APPOINTMENT (OUTPATIENT)
Age: 84
End: 2024-06-30

## 2024-07-15 ENCOUNTER — RX RENEWAL (OUTPATIENT)
Age: 84
End: 2024-07-15

## 2024-07-19 ENCOUNTER — RX RENEWAL (OUTPATIENT)
Age: 84
End: 2024-07-19

## 2024-09-02 ENCOUNTER — RX RENEWAL (OUTPATIENT)
Age: 84
End: 2024-09-02

## 2024-09-03 ENCOUNTER — RX RENEWAL (OUTPATIENT)
Age: 84
End: 2024-09-03

## 2024-09-16 ENCOUNTER — NON-APPOINTMENT (OUTPATIENT)
Age: 84
End: 2024-09-16

## 2024-09-24 ENCOUNTER — NON-APPOINTMENT (OUTPATIENT)
Age: 84
End: 2024-09-24

## 2024-09-27 ENCOUNTER — RX RENEWAL (OUTPATIENT)
Age: 84
End: 2024-09-27

## 2024-09-27 ENCOUNTER — APPOINTMENT (OUTPATIENT)
Dept: INTERNAL MEDICINE | Facility: CLINIC | Age: 84
End: 2024-09-27

## 2024-09-30 ENCOUNTER — NON-APPOINTMENT (OUTPATIENT)
Age: 84
End: 2024-09-30

## 2024-09-30 ENCOUNTER — APPOINTMENT (OUTPATIENT)
Dept: INTERNAL MEDICINE | Facility: CLINIC | Age: 84
End: 2024-09-30

## 2024-10-03 ENCOUNTER — APPOINTMENT (OUTPATIENT)
Dept: INTERNAL MEDICINE | Facility: CLINIC | Age: 84
End: 2024-10-03
Payer: MEDICARE

## 2024-10-03 VITALS
SYSTOLIC BLOOD PRESSURE: 110 MMHG | HEIGHT: 66 IN | RESPIRATION RATE: 17 BRPM | BODY MASS INDEX: 23.14 KG/M2 | HEART RATE: 90 BPM | DIASTOLIC BLOOD PRESSURE: 68 MMHG | TEMPERATURE: 97.6 F | OXYGEN SATURATION: 99 % | WEIGHT: 144 LBS

## 2024-10-03 DIAGNOSIS — Z87.891 PERSONAL HISTORY OF NICOTINE DEPENDENCE: ICD-10-CM

## 2024-10-03 DIAGNOSIS — R93.5 ABNORMAL FINDINGS ON DIAGNOSTIC IMAGING OF OTHER ABDOMINAL REGIONS, INCLUDING RETROPERITONEUM: ICD-10-CM

## 2024-10-03 PROCEDURE — 99215 OFFICE O/P EST HI 40 MIN: CPT

## 2024-10-03 PROCEDURE — G2211 COMPLEX E/M VISIT ADD ON: CPT

## 2024-10-05 ENCOUNTER — TRANSCRIPTION ENCOUNTER (OUTPATIENT)
Age: 84
End: 2024-10-05

## 2024-10-07 ENCOUNTER — APPOINTMENT (OUTPATIENT)
Dept: ENDOCRINOLOGY | Facility: CLINIC | Age: 84
End: 2024-10-07
Payer: MEDICARE

## 2024-10-07 VITALS
WEIGHT: 144 LBS | BODY MASS INDEX: 23.14 KG/M2 | DIASTOLIC BLOOD PRESSURE: 68 MMHG | HEIGHT: 66 IN | SYSTOLIC BLOOD PRESSURE: 111 MMHG | OXYGEN SATURATION: 96 % | HEART RATE: 90 BPM

## 2024-10-07 DIAGNOSIS — R79.89 OTHER SPECIFIED ABNORMAL FINDINGS OF BLOOD CHEMISTRY: ICD-10-CM

## 2024-10-07 DIAGNOSIS — E11.9 TYPE 2 DIABETES MELLITUS W/OUT COMPLICATIONS: ICD-10-CM

## 2024-10-07 LAB
ALBUMIN SERPL ELPH-MCNC: 4.3 G/DL
ALP BLD-CCNC: 70 U/L
ALT SERPL-CCNC: 11 U/L
ANION GAP SERPL CALC-SCNC: 15 MMOL/L
AST SERPL-CCNC: 16 U/L
BASOPHILS # BLD AUTO: 0.03 K/UL
BASOPHILS NFR BLD AUTO: 0.6 %
BILIRUB SERPL-MCNC: 0.3 MG/DL
BUN SERPL-MCNC: 28 MG/DL
CALCIUM SERPL-MCNC: 11.3 MG/DL
CHLORIDE SERPL-SCNC: 96 MMOL/L
CO2 SERPL-SCNC: 26 MMOL/L
CREAT SERPL-MCNC: 1.21 MG/DL
EGFR: 58 ML/MIN/1.73M2
EOSINOPHIL # BLD AUTO: 0.07 K/UL
EOSINOPHIL NFR BLD AUTO: 1.3 %
FERRITIN SERPL-MCNC: 74 NG/ML
FOLATE SERPL-MCNC: 15.2 NG/ML
GLUCOSE SERPL-MCNC: 136 MG/DL
HBA1C MFR BLD HPLC: 7.4
HCT VFR BLD CALC: 38.8 %
HGB BLD-MCNC: 13.3 G/DL
IMM GRANULOCYTES NFR BLD AUTO: 0.4 %
IRON SATN MFR SERPL: 25 %
IRON SERPL-MCNC: 83 UG/DL
LYMPHOCYTES # BLD AUTO: 2.17 K/UL
LYMPHOCYTES NFR BLD AUTO: 41.7 %
MAN DIFF?: NORMAL
MCHC RBC-ENTMCNC: 32.9 PG
MCHC RBC-ENTMCNC: 34.3 GM/DL
MCV RBC AUTO: 96 FL
MONOCYTES # BLD AUTO: 0.43 K/UL
MONOCYTES NFR BLD AUTO: 8.3 %
NEUTROPHILS # BLD AUTO: 2.48 K/UL
NEUTROPHILS NFR BLD AUTO: 47.7 %
PLATELET # BLD AUTO: 244 K/UL
POTASSIUM SERPL-SCNC: 4.4 MMOL/L
PROT SERPL-MCNC: 7.4 G/DL
RBC # BLD: 4.04 M/UL
RBC # FLD: 14.4 %
SODIUM SERPL-SCNC: 137 MMOL/L
TIBC SERPL-MCNC: 327 UG/DL
UIBC SERPL-MCNC: 245 UG/DL
VIT B12 SERPL-MCNC: 498 PG/ML
WBC # FLD AUTO: 5.2 K/UL

## 2024-10-07 PROCEDURE — 36415 COLL VENOUS BLD VENIPUNCTURE: CPT

## 2024-10-07 PROCEDURE — 83036 HEMOGLOBIN GLYCOSYLATED A1C: CPT | Mod: QW

## 2024-10-07 PROCEDURE — 99214 OFFICE O/P EST MOD 30 MIN: CPT

## 2024-10-14 LAB
24R-OH-CALCIDIOL SERPL-MCNC: 25.7 PG/ML
25(OH)D3 SERPL-MCNC: 27.3 NG/ML
ALBUMIN SERPL ELPH-MCNC: 4.2 G/DL
ALP BLD-CCNC: 73 U/L
ALT SERPL-CCNC: 12 U/L
ANION GAP SERPL CALC-SCNC: 18 MMOL/L
AST SERPL-CCNC: 17 U/L
BILIRUB SERPL-MCNC: 0.2 MG/DL
BUN SERPL-MCNC: 26 MG/DL
CALCIUM SERPL-MCNC: 11 MG/DL
CALCIUM SERPL-MCNC: 11 MG/DL
CHLORIDE SERPL-SCNC: 95 MMOL/L
CO2 SERPL-SCNC: 24 MMOL/L
CREAT SERPL-MCNC: 1.3 MG/DL
EGFR: 54 ML/MIN/1.73M2
GLUCOSE SERPL-MCNC: 175 MG/DL
PARATHYROID HORMONE INTACT: 77 PG/ML
POTASSIUM SERPL-SCNC: 4.4 MMOL/L
PROT SERPL-MCNC: 7.4 G/DL
SODIUM SERPL-SCNC: 137 MMOL/L
TSH SERPL-ACNC: 1.26 UIU/ML

## 2024-10-19 ENCOUNTER — APPOINTMENT (OUTPATIENT)
Dept: CT IMAGING | Facility: CLINIC | Age: 84
End: 2024-10-19

## 2024-10-19 ENCOUNTER — OUTPATIENT (OUTPATIENT)
Dept: OUTPATIENT SERVICES | Facility: HOSPITAL | Age: 84
LOS: 1 days | End: 2024-10-19
Payer: COMMERCIAL

## 2024-10-19 DIAGNOSIS — Z98.89 OTHER SPECIFIED POSTPROCEDURAL STATES: Chronic | ICD-10-CM

## 2024-10-19 DIAGNOSIS — R93.5 ABNORMAL FINDINGS ON DIAGNOSTIC IMAGING OF OTHER ABDOMINAL REGIONS, INCLUDING RETROPERITONEUM: ICD-10-CM

## 2024-10-19 PROCEDURE — 71250 CT THORAX DX C-: CPT

## 2024-10-19 PROCEDURE — 71250 CT THORAX DX C-: CPT | Mod: 26

## 2024-11-04 ENCOUNTER — NON-APPOINTMENT (OUTPATIENT)
Age: 84
End: 2024-11-04

## 2024-11-07 ENCOUNTER — APPOINTMENT (OUTPATIENT)
Dept: UROLOGY | Facility: CLINIC | Age: 84
End: 2024-11-07
Payer: MEDICARE

## 2024-11-07 DIAGNOSIS — N13.8 BENIGN PROSTATIC HYPERPLASIA WITH LOWER URINARY TRACT SYMPMS: ICD-10-CM

## 2024-11-07 DIAGNOSIS — R97.20 ELEVATED PROSTATE, SPECIFIC ANTIGEN [PSA]: ICD-10-CM

## 2024-11-07 DIAGNOSIS — N40.1 BENIGN PROSTATIC HYPERPLASIA WITH LOWER URINARY TRACT SYMPMS: ICD-10-CM

## 2024-11-07 PROCEDURE — 99214 OFFICE O/P EST MOD 30 MIN: CPT

## 2024-11-07 PROCEDURE — G2211 COMPLEX E/M VISIT ADD ON: CPT

## 2024-11-12 ENCOUNTER — NON-APPOINTMENT (OUTPATIENT)
Age: 84
End: 2024-11-12

## 2024-11-19 ENCOUNTER — APPOINTMENT (OUTPATIENT)
Dept: CARDIOLOGY | Facility: CLINIC | Age: 84
End: 2024-11-19
Payer: MEDICARE

## 2024-11-19 ENCOUNTER — NON-APPOINTMENT (OUTPATIENT)
Age: 84
End: 2024-11-19

## 2024-11-19 VITALS
HEART RATE: 99 BPM | SYSTOLIC BLOOD PRESSURE: 120 MMHG | BODY MASS INDEX: 22.5 KG/M2 | HEIGHT: 66 IN | WEIGHT: 140 LBS | DIASTOLIC BLOOD PRESSURE: 80 MMHG | OXYGEN SATURATION: 98 %

## 2024-11-19 DIAGNOSIS — I63.9 CEREBRAL INFARCTION, UNSPECIFIED: ICD-10-CM

## 2024-11-19 PROCEDURE — 93000 ELECTROCARDIOGRAM COMPLETE: CPT

## 2024-11-19 PROCEDURE — 99214 OFFICE O/P EST MOD 30 MIN: CPT

## 2024-11-19 PROCEDURE — 93306 TTE W/DOPPLER COMPLETE: CPT

## 2024-11-19 PROCEDURE — G2211 COMPLEX E/M VISIT ADD ON: CPT

## 2024-11-25 ENCOUNTER — APPOINTMENT (OUTPATIENT)
Dept: INTERNAL MEDICINE | Facility: CLINIC | Age: 84
End: 2024-11-25
Payer: MEDICARE

## 2024-11-25 VITALS
TEMPERATURE: 97.8 F | SYSTOLIC BLOOD PRESSURE: 126 MMHG | BODY MASS INDEX: 22.5 KG/M2 | OXYGEN SATURATION: 100 % | WEIGHT: 140 LBS | RESPIRATION RATE: 17 BRPM | DIASTOLIC BLOOD PRESSURE: 79 MMHG | HEIGHT: 66 IN | HEART RATE: 75 BPM

## 2024-11-25 DIAGNOSIS — D50.9 IRON DEFICIENCY ANEMIA, UNSPECIFIED: ICD-10-CM

## 2024-11-25 DIAGNOSIS — Z00.00 ENCOUNTER FOR GENERAL ADULT MEDICAL EXAMINATION W/OUT ABNORMAL FINDINGS: ICD-10-CM

## 2024-11-25 DIAGNOSIS — R93.5 ABNORMAL FINDINGS ON DIAGNOSTIC IMAGING OF OTHER ABDOMINAL REGIONS, INCLUDING RETROPERITONEUM: ICD-10-CM

## 2024-11-25 DIAGNOSIS — I10 ESSENTIAL (PRIMARY) HYPERTENSION: ICD-10-CM

## 2024-11-25 DIAGNOSIS — E11.9 TYPE 2 DIABETES MELLITUS W/OUT COMPLICATIONS: ICD-10-CM

## 2024-11-25 DIAGNOSIS — Z87.891 PERSONAL HISTORY OF NICOTINE DEPENDENCE: ICD-10-CM

## 2024-11-25 DIAGNOSIS — Z23 ENCOUNTER FOR IMMUNIZATION: ICD-10-CM

## 2024-11-25 DIAGNOSIS — R63.4 ABNORMAL WEIGHT LOSS: ICD-10-CM

## 2024-11-25 DIAGNOSIS — Z12.11 ENCOUNTER FOR SCREENING FOR MALIGNANT NEOPLASM OF COLON: ICD-10-CM

## 2024-11-25 PROCEDURE — G0439: CPT

## 2024-11-25 PROCEDURE — 90686 IIV4 VACC NO PRSV 0.5 ML IM: CPT

## 2024-11-25 PROCEDURE — G0008: CPT

## 2024-11-26 ENCOUNTER — TRANSCRIPTION ENCOUNTER (OUTPATIENT)
Age: 84
End: 2024-11-26

## 2024-11-26 ENCOUNTER — NON-APPOINTMENT (OUTPATIENT)
Age: 84
End: 2024-11-26

## 2024-11-26 LAB
BASOPHILS # BLD AUTO: 0.04 K/UL
BASOPHILS NFR BLD AUTO: 0.9 %
CHOLEST SERPL-MCNC: 140 MG/DL
EOSINOPHIL # BLD AUTO: 0.11 K/UL
EOSINOPHIL NFR BLD AUTO: 2.6 %
FERRITIN SERPL-MCNC: 65 NG/ML
FOLATE SERPL-MCNC: 16.6 NG/ML
HCT VFR BLD CALC: 41.4 %
HDLC SERPL-MCNC: 71 MG/DL
HGB BLD-MCNC: 13.9 G/DL
IMM GRANULOCYTES NFR BLD AUTO: 0.2 %
IRON SATN MFR SERPL: 25 %
IRON SERPL-MCNC: 80 UG/DL
LDLC SERPL CALC-MCNC: 56 MG/DL
LYMPHOCYTES # BLD AUTO: 1.71 K/UL
LYMPHOCYTES NFR BLD AUTO: 40.5 %
MAN DIFF?: NORMAL
MCHC RBC-ENTMCNC: 32.6 PG
MCHC RBC-ENTMCNC: 33.6 G/DL
MCV RBC AUTO: 97 FL
MONOCYTES # BLD AUTO: 0.36 K/UL
MONOCYTES NFR BLD AUTO: 8.5 %
NEUTROPHILS # BLD AUTO: 1.99 K/UL
NEUTROPHILS NFR BLD AUTO: 47.3 %
NONHDLC SERPL-MCNC: 69 MG/DL
PLATELET # BLD AUTO: 194 K/UL
RBC # BLD: 4.27 M/UL
RBC # FLD: 14.6 %
TIBC SERPL-MCNC: 322 UG/DL
TRIGL SERPL-MCNC: 61 MG/DL
UIBC SERPL-MCNC: 242 UG/DL
VIT B12 SERPL-MCNC: 682 PG/ML
WBC # FLD AUTO: 4.22 K/UL

## 2024-12-22 LAB — HEMOCCULT STL QL IA: NEGATIVE

## 2024-12-24 ENCOUNTER — RX RENEWAL (OUTPATIENT)
Age: 84
End: 2024-12-24

## 2025-01-10 ENCOUNTER — RX RENEWAL (OUTPATIENT)
Age: 85
End: 2025-01-10

## 2025-01-28 ENCOUNTER — APPOINTMENT (OUTPATIENT)
Dept: INTERNAL MEDICINE | Facility: CLINIC | Age: 85
End: 2025-01-28
Payer: MEDICARE

## 2025-01-28 VITALS
HEART RATE: 89 BPM | HEIGHT: 66 IN | TEMPERATURE: 98.3 F | RESPIRATION RATE: 17 BRPM | BODY MASS INDEX: 23.3 KG/M2 | WEIGHT: 145 LBS | DIASTOLIC BLOOD PRESSURE: 87 MMHG | SYSTOLIC BLOOD PRESSURE: 135 MMHG | OXYGEN SATURATION: 100 %

## 2025-01-28 DIAGNOSIS — E55.9 VITAMIN D DEFICIENCY, UNSPECIFIED: ICD-10-CM

## 2025-01-28 DIAGNOSIS — R79.0 ABNORMAL LVL OF BLOOD MINERAL: ICD-10-CM

## 2025-01-28 DIAGNOSIS — R63.4 ABNORMAL WEIGHT LOSS: ICD-10-CM

## 2025-01-28 DIAGNOSIS — E11.9 TYPE 2 DIABETES MELLITUS W/OUT COMPLICATIONS: ICD-10-CM

## 2025-01-28 DIAGNOSIS — R41.3 OTHER AMNESIA: ICD-10-CM

## 2025-01-28 DIAGNOSIS — I63.9 CEREBRAL INFARCTION, UNSPECIFIED: ICD-10-CM

## 2025-01-28 DIAGNOSIS — I10 ESSENTIAL (PRIMARY) HYPERTENSION: ICD-10-CM

## 2025-01-28 DIAGNOSIS — Z12.11 ENCOUNTER FOR SCREENING FOR MALIGNANT NEOPLASM OF COLON: ICD-10-CM

## 2025-01-28 DIAGNOSIS — R79.89 OTHER SPECIFIED ABNORMAL FINDINGS OF BLOOD CHEMISTRY: ICD-10-CM

## 2025-01-28 DIAGNOSIS — R93.5 ABNORMAL FINDINGS ON DIAGNOSTIC IMAGING OF OTHER ABDOMINAL REGIONS, INCLUDING RETROPERITONEUM: ICD-10-CM

## 2025-01-28 PROCEDURE — G2211 COMPLEX E/M VISIT ADD ON: CPT

## 2025-01-28 PROCEDURE — 99214 OFFICE O/P EST MOD 30 MIN: CPT

## 2025-04-01 ENCOUNTER — RX RENEWAL (OUTPATIENT)
Age: 85
End: 2025-04-01

## 2025-04-28 ENCOUNTER — APPOINTMENT (OUTPATIENT)
Dept: UROLOGY | Facility: CLINIC | Age: 85
End: 2025-04-28
Payer: MEDICARE

## 2025-04-28 ENCOUNTER — OUTPATIENT (OUTPATIENT)
Dept: OUTPATIENT SERVICES | Facility: HOSPITAL | Age: 85
LOS: 1 days | End: 2025-04-28

## 2025-04-28 DIAGNOSIS — N13.8 BENIGN PROSTATIC HYPERPLASIA WITH LOWER URINARY TRACT SYMPMS: ICD-10-CM

## 2025-04-28 DIAGNOSIS — R35.0 FREQUENCY OF MICTURITION: ICD-10-CM

## 2025-04-28 DIAGNOSIS — N40.1 BENIGN PROSTATIC HYPERPLASIA WITH LOWER URINARY TRACT SYMPMS: ICD-10-CM

## 2025-04-28 DIAGNOSIS — Z98.89 OTHER SPECIFIED POSTPROCEDURAL STATES: Chronic | ICD-10-CM

## 2025-04-28 DIAGNOSIS — R93.5 ABNORMAL FINDINGS ON DIAGNOSTIC IMAGING OF OTHER ABDOMINAL REGIONS, INCLUDING RETROPERITONEUM: ICD-10-CM

## 2025-04-28 PROCEDURE — 51798 US URINE CAPACITY MEASURE: CPT

## 2025-04-28 PROCEDURE — G2211 COMPLEX E/M VISIT ADD ON: CPT

## 2025-04-28 PROCEDURE — 99214 OFFICE O/P EST MOD 30 MIN: CPT

## 2025-05-01 ENCOUNTER — APPOINTMENT (OUTPATIENT)
Dept: ENDOCRINOLOGY | Facility: CLINIC | Age: 85
End: 2025-05-01

## 2025-05-01 VITALS
HEIGHT: 66 IN | SYSTOLIC BLOOD PRESSURE: 152 MMHG | WEIGHT: 145 LBS | BODY MASS INDEX: 23.3 KG/M2 | DIASTOLIC BLOOD PRESSURE: 87 MMHG | HEART RATE: 72 BPM | OXYGEN SATURATION: 100 %

## 2025-05-01 DIAGNOSIS — E11.9 TYPE 2 DIABETES MELLITUS W/OUT COMPLICATIONS: ICD-10-CM

## 2025-05-01 DIAGNOSIS — R79.89 OTHER SPECIFIED ABNORMAL FINDINGS OF BLOOD CHEMISTRY: ICD-10-CM

## 2025-05-01 PROCEDURE — 99214 OFFICE O/P EST MOD 30 MIN: CPT

## 2025-05-02 LAB
25(OH)D3 SERPL-MCNC: 27.1 NG/ML
25(OH)D3 SERPL-MCNC: 27.4 NG/ML
ALBUMIN SERPL ELPH-MCNC: 4.4 G/DL
ALP BLD-CCNC: 65 U/L
ALT SERPL-CCNC: 18 U/L
ANION GAP SERPL CALC-SCNC: 15 MMOL/L
AST SERPL-CCNC: 20 U/L
BILIRUB SERPL-MCNC: 0.4 MG/DL
BUN SERPL-MCNC: 37 MG/DL
CALCIUM SERPL-MCNC: 11.5 MG/DL
CALCIUM SERPL-MCNC: 11.6 MG/DL
CHLORIDE SERPL-SCNC: 97 MMOL/L
CHOLEST SERPL-MCNC: 138 MG/DL
CO2 SERPL-SCNC: 26 MMOL/L
CREAT SERPL-MCNC: 1.26 MG/DL
CREAT SPEC-SCNC: 24 MG/DL
EGFRCR SERPLBLD CKD-EPI 2021: 56 ML/MIN/1.73M2
ESTIMATED AVERAGE GLUCOSE: 174 MG/DL
GLUCOSE SERPL-MCNC: 189 MG/DL
HBA1C MFR BLD HPLC: 7.7 %
HDLC SERPL-MCNC: 77 MG/DL
LDLC SERPL-MCNC: 49 MG/DL
MAGNESIUM SERPL-MCNC: 1.9 MG/DL
MICROALBUMIN 24H UR DL<=1MG/L-MCNC: <1.2 MG/DL
MICROALBUMIN/CREAT 24H UR-RTO: NORMAL MG/G
NONHDLC SERPL-MCNC: 61 MG/DL
PARATHYROID HORMONE INTACT: 60 PG/ML
PHOSPHATE SERPL-MCNC: 3.2 MG/DL
POTASSIUM SERPL-SCNC: 4.7 MMOL/L
PROT SERPL-MCNC: 7.3 G/DL
SODIUM SERPL-SCNC: 139 MMOL/L
TRIGL SERPL-MCNC: 52 MG/DL
TSH SERPL-ACNC: 2.01 UIU/ML
VIT B12 SERPL-MCNC: 390 PG/ML

## 2025-05-12 ENCOUNTER — RX RENEWAL (OUTPATIENT)
Age: 85
End: 2025-05-12

## 2025-05-20 ENCOUNTER — NON-APPOINTMENT (OUTPATIENT)
Age: 85
End: 2025-05-20

## 2025-05-20 ENCOUNTER — APPOINTMENT (OUTPATIENT)
Dept: CARDIOLOGY | Facility: CLINIC | Age: 85
End: 2025-05-20
Payer: MEDICARE

## 2025-05-20 VITALS
BODY MASS INDEX: 23.3 KG/M2 | DIASTOLIC BLOOD PRESSURE: 78 MMHG | WEIGHT: 145 LBS | HEART RATE: 94 BPM | SYSTOLIC BLOOD PRESSURE: 100 MMHG | OXYGEN SATURATION: 97 % | HEIGHT: 66 IN

## 2025-05-20 DIAGNOSIS — I10 ESSENTIAL (PRIMARY) HYPERTENSION: ICD-10-CM

## 2025-05-20 DIAGNOSIS — Z87.891 PERSONAL HISTORY OF NICOTINE DEPENDENCE: ICD-10-CM

## 2025-05-20 PROCEDURE — 99214 OFFICE O/P EST MOD 30 MIN: CPT

## 2025-05-20 PROCEDURE — G2211 COMPLEX E/M VISIT ADD ON: CPT

## 2025-05-20 PROCEDURE — 93000 ELECTROCARDIOGRAM COMPLETE: CPT

## 2025-05-30 ENCOUNTER — APPOINTMENT (OUTPATIENT)
Dept: RADIOLOGY | Facility: CLINIC | Age: 85
End: 2025-05-30
Payer: MEDICARE

## 2025-05-30 ENCOUNTER — OUTPATIENT (OUTPATIENT)
Dept: OUTPATIENT SERVICES | Facility: HOSPITAL | Age: 85
LOS: 1 days | End: 2025-05-30
Payer: COMMERCIAL

## 2025-05-30 ENCOUNTER — APPOINTMENT (OUTPATIENT)
Dept: ULTRASOUND IMAGING | Facility: CLINIC | Age: 85
End: 2025-05-30
Payer: MEDICARE

## 2025-05-30 DIAGNOSIS — Z98.89 OTHER SPECIFIED POSTPROCEDURAL STATES: Chronic | ICD-10-CM

## 2025-05-30 DIAGNOSIS — R79.89 OTHER SPECIFIED ABNORMAL FINDINGS OF BLOOD CHEMISTRY: ICD-10-CM

## 2025-05-30 PROCEDURE — 77085 DXA BONE DENSITY AXL VRT FX: CPT

## 2025-05-30 PROCEDURE — 76536 US EXAM OF HEAD AND NECK: CPT | Mod: 26

## 2025-05-30 PROCEDURE — 77085 DXA BONE DENSITY AXL VRT FX: CPT | Mod: 26

## 2025-05-30 PROCEDURE — 76536 US EXAM OF HEAD AND NECK: CPT

## 2025-06-03 ENCOUNTER — APPOINTMENT (OUTPATIENT)
Dept: RADIOLOGY | Facility: CLINIC | Age: 85
End: 2025-06-03

## 2025-06-09 ENCOUNTER — RX RENEWAL (OUTPATIENT)
Age: 85
End: 2025-06-09

## 2025-08-11 ENCOUNTER — NON-APPOINTMENT (OUTPATIENT)
Age: 85
End: 2025-08-11

## 2025-08-11 DIAGNOSIS — R39.9 UNSPECIFIED SYMPTOMS AND SIGNS INVOLVING THE GENITOURINARY SYSTEM: ICD-10-CM

## 2025-08-11 LAB
APPEARANCE: CLEAR
BACTERIA: NEGATIVE /HPF
BILIRUBIN URINE: NEGATIVE
BLOOD URINE: NEGATIVE
CAST: 0 /LPF
COLOR: YELLOW
EPITHELIAL CELLS: 0 /HPF
GLUCOSE QUALITATIVE U: >=1000 MG/DL
KETONES URINE: NEGATIVE MG/DL
LEUKOCYTE ESTERASE URINE: NEGATIVE
MICROSCOPIC-UA: NORMAL
NITRITE URINE: NEGATIVE
PH URINE: 5.5
PROTEIN URINE: NEGATIVE MG/DL
RED BLOOD CELLS URINE: 0 /HPF
SPECIFIC GRAVITY URINE: 1.02
UROBILINOGEN URINE: 0.2 MG/DL
WHITE BLOOD CELLS URINE: 0 /HPF

## 2025-08-13 ENCOUNTER — APPOINTMENT (OUTPATIENT)
Dept: UROLOGY | Facility: CLINIC | Age: 85
End: 2025-08-13
Payer: MEDICARE

## 2025-08-13 DIAGNOSIS — N40.1 BENIGN PROSTATIC HYPERPLASIA WITH LOWER URINARY TRACT SYMPMS: ICD-10-CM

## 2025-08-13 DIAGNOSIS — N13.8 BENIGN PROSTATIC HYPERPLASIA WITH LOWER URINARY TRACT SYMPMS: ICD-10-CM

## 2025-08-13 DIAGNOSIS — R21 RASH AND OTHER NONSPECIFIC SKIN ERUPTION: ICD-10-CM

## 2025-08-13 DIAGNOSIS — N48.1 BALANITIS: ICD-10-CM

## 2025-08-13 DIAGNOSIS — R30.0 DYSURIA: ICD-10-CM

## 2025-08-13 LAB — BACTERIA UR CULT: ABNORMAL

## 2025-08-13 PROCEDURE — 99214 OFFICE O/P EST MOD 30 MIN: CPT | Mod: 2W

## 2025-08-13 RX ORDER — CLOTRIMAZOLE AND BETAMETHASONE DIPROPIONATE 10; .5 MG/G; MG/G
1-0.05 CREAM TOPICAL
Qty: 1 | Refills: 1 | Status: ACTIVE | COMMUNITY
Start: 2025-08-13 | End: 1900-01-01

## 2025-09-10 ENCOUNTER — RX RENEWAL (OUTPATIENT)
Age: 85
End: 2025-09-10

## 2025-09-11 ENCOUNTER — APPOINTMENT (OUTPATIENT)
Dept: UROLOGY | Facility: CLINIC | Age: 85
End: 2025-09-11
Payer: MEDICARE

## 2025-09-11 VITALS — HEART RATE: 94 BPM | SYSTOLIC BLOOD PRESSURE: 127 MMHG | DIASTOLIC BLOOD PRESSURE: 73 MMHG

## 2025-09-11 DIAGNOSIS — R39.9 UNSPECIFIED SYMPTOMS AND SIGNS INVOLVING THE GENITOURINARY SYSTEM: ICD-10-CM

## 2025-09-11 DIAGNOSIS — R97.20 ELEVATED PROSTATE, SPECIFIC ANTIGEN [PSA]: ICD-10-CM

## 2025-09-11 PROCEDURE — G2211 COMPLEX E/M VISIT ADD ON: CPT

## 2025-09-11 PROCEDURE — 99214 OFFICE O/P EST MOD 30 MIN: CPT

## 2025-09-15 ENCOUNTER — APPOINTMENT (OUTPATIENT)
Dept: UROLOGY | Facility: CLINIC | Age: 85
End: 2025-09-15
Payer: MEDICARE

## 2025-09-15 ENCOUNTER — APPOINTMENT (OUTPATIENT)
Dept: UROLOGY | Facility: CLINIC | Age: 85
End: 2025-09-15

## 2025-09-15 PROCEDURE — 76775 US EXAM ABDO BACK WALL LIM: CPT | Mod: 26

## 2025-09-15 PROCEDURE — 51798 US URINE CAPACITY MEASURE: CPT

## 2025-09-15 PROCEDURE — G2211 COMPLEX E/M VISIT ADD ON: CPT

## 2025-09-15 PROCEDURE — 99214 OFFICE O/P EST MOD 30 MIN: CPT
